# Patient Record
Sex: FEMALE | NOT HISPANIC OR LATINO | Employment: FULL TIME | URBAN - METROPOLITAN AREA
[De-identification: names, ages, dates, MRNs, and addresses within clinical notes are randomized per-mention and may not be internally consistent; named-entity substitution may affect disease eponyms.]

---

## 2017-09-27 ENCOUNTER — APPOINTMENT (OUTPATIENT)
Dept: RADIOLOGY | Facility: MEDICAL CENTER | Age: 22
End: 2017-09-27
Attending: EMERGENCY MEDICINE

## 2017-09-27 ENCOUNTER — HOSPITAL ENCOUNTER (EMERGENCY)
Facility: MEDICAL CENTER | Age: 22
End: 2017-09-28
Attending: EMERGENCY MEDICINE

## 2017-09-27 DIAGNOSIS — Z34.91 NORMAL IUP (INTRAUTERINE PREGNANCY) ON PRENATAL ULTRASOUND, FIRST TRIMESTER: ICD-10-CM

## 2017-09-27 DIAGNOSIS — N39.0 URINARY TRACT INFECTION WITHOUT HEMATURIA, SITE UNSPECIFIED: ICD-10-CM

## 2017-09-27 DIAGNOSIS — O09.90 AT HIGH RISK FOR COMPLICATIONS OF INTRAUTERINE PREGNANCY (IUP): ICD-10-CM

## 2017-09-27 LAB
ALBUMIN SERPL BCP-MCNC: 4.2 G/DL (ref 3.2–4.9)
ALBUMIN/GLOB SERPL: 1.4 G/DL
ALP SERPL-CCNC: 53 U/L (ref 30–99)
ALT SERPL-CCNC: 11 U/L (ref 2–50)
ANION GAP SERPL CALC-SCNC: 9 MMOL/L (ref 0–11.9)
APPEARANCE UR: ABNORMAL
AST SERPL-CCNC: 13 U/L (ref 12–45)
B-HCG SERPL-ACNC: ABNORMAL MIU/ML (ref 0–5)
BACTERIA #/AREA URNS HPF: ABNORMAL /HPF
BACTERIA GENITAL QL WET PREP: NORMAL
BASOPHILS # BLD AUTO: 0.5 % (ref 0–1.8)
BASOPHILS # BLD: 0.03 K/UL (ref 0–0.12)
BILIRUB SERPL-MCNC: 0.3 MG/DL (ref 0.1–1.5)
BILIRUB UR QL STRIP.AUTO: NEGATIVE
BUN SERPL-MCNC: 6 MG/DL (ref 8–22)
CALCIUM SERPL-MCNC: 9.6 MG/DL (ref 8.5–10.5)
CHLORIDE SERPL-SCNC: 105 MMOL/L (ref 96–112)
CO2 SERPL-SCNC: 21 MMOL/L (ref 20–33)
COLOR UR: YELLOW
CREAT SERPL-MCNC: 0.69 MG/DL (ref 0.5–1.4)
CULTURE IF INDICATED INDCX: YES UA CULTURE
EOSINOPHIL # BLD AUTO: 0.07 K/UL (ref 0–0.51)
EOSINOPHIL NFR BLD: 1.3 % (ref 0–6.9)
EPI CELLS #/AREA URNS HPF: ABNORMAL /HPF
ERYTHROCYTE [DISTWIDTH] IN BLOOD BY AUTOMATED COUNT: 39.7 FL (ref 35.9–50)
GFR SERPL CREATININE-BSD FRML MDRD: >60 ML/MIN/1.73 M 2
GLOBULIN SER CALC-MCNC: 2.9 G/DL (ref 1.9–3.5)
GLUCOSE SERPL-MCNC: 90 MG/DL (ref 65–99)
GLUCOSE UR STRIP.AUTO-MCNC: NEGATIVE MG/DL
HCT VFR BLD AUTO: 40.7 % (ref 37–47)
HGB BLD-MCNC: 13.8 G/DL (ref 12–16)
HYALINE CASTS #/AREA URNS LPF: ABNORMAL /LPF
IMM GRANULOCYTES # BLD AUTO: 0.02 K/UL (ref 0–0.11)
IMM GRANULOCYTES NFR BLD AUTO: 0.4 % (ref 0–0.9)
KETONES UR STRIP.AUTO-MCNC: NEGATIVE MG/DL
LEUKOCYTE ESTERASE UR QL STRIP.AUTO: ABNORMAL
LYMPHOCYTES # BLD AUTO: 1.52 K/UL (ref 1–4.8)
LYMPHOCYTES NFR BLD: 27.2 % (ref 22–41)
MCH RBC QN AUTO: 30.1 PG (ref 27–33)
MCHC RBC AUTO-ENTMCNC: 33.9 G/DL (ref 33.6–35)
MCV RBC AUTO: 88.7 FL (ref 81.4–97.8)
MICRO URNS: ABNORMAL
MONOCYTES # BLD AUTO: 0.46 K/UL (ref 0–0.85)
MONOCYTES NFR BLD AUTO: 8.2 % (ref 0–13.4)
NEUTROPHILS # BLD AUTO: 3.49 K/UL (ref 2–7.15)
NEUTROPHILS NFR BLD: 62.4 % (ref 44–72)
NITRITE UR QL STRIP.AUTO: POSITIVE
NRBC # BLD AUTO: 0 K/UL
NRBC BLD AUTO-RTO: 0 /100 WBC
PH UR STRIP.AUTO: 7 [PH]
PLATELET # BLD AUTO: 241 K/UL (ref 164–446)
PMV BLD AUTO: 10.1 FL (ref 9–12.9)
POTASSIUM SERPL-SCNC: 3.8 MMOL/L (ref 3.6–5.5)
PROT SERPL-MCNC: 7.1 G/DL (ref 6–8.2)
PROT UR QL STRIP: NEGATIVE MG/DL
RBC # BLD AUTO: 4.59 M/UL (ref 4.2–5.4)
RBC # URNS HPF: ABNORMAL /HPF
RBC UR QL AUTO: NEGATIVE
SIGNIFICANT IND 70042: NORMAL
SITE SITE: NORMAL
SODIUM SERPL-SCNC: 135 MMOL/L (ref 135–145)
SOURCE SOURCE: NORMAL
SP GR UR STRIP.AUTO: 1.01
UROBILINOGEN UR STRIP.AUTO-MCNC: 0.2 MG/DL
WBC # BLD AUTO: 5.6 K/UL (ref 4.8–10.8)
WBC #/AREA URNS HPF: ABNORMAL /HPF

## 2017-09-27 PROCEDURE — 99284 EMERGENCY DEPT VISIT MOD MDM: CPT

## 2017-09-27 PROCEDURE — 80053 COMPREHEN METABOLIC PANEL: CPT

## 2017-09-27 PROCEDURE — A9270 NON-COVERED ITEM OR SERVICE: HCPCS | Performed by: EMERGENCY MEDICINE

## 2017-09-27 PROCEDURE — 85025 COMPLETE CBC W/AUTO DIFF WBC: CPT

## 2017-09-27 PROCEDURE — 87491 CHLMYD TRACH DNA AMP PROBE: CPT

## 2017-09-27 PROCEDURE — 81001 URINALYSIS AUTO W/SCOPE: CPT

## 2017-09-27 PROCEDURE — 700102 HCHG RX REV CODE 250 W/ 637 OVERRIDE(OP): Performed by: EMERGENCY MEDICINE

## 2017-09-27 PROCEDURE — 87186 SC STD MICRODIL/AGAR DIL: CPT

## 2017-09-27 PROCEDURE — 84702 CHORIONIC GONADOTROPIN TEST: CPT

## 2017-09-27 PROCEDURE — 87086 URINE CULTURE/COLONY COUNT: CPT

## 2017-09-27 PROCEDURE — 87591 N.GONORRHOEAE DNA AMP PROB: CPT

## 2017-09-27 PROCEDURE — 87077 CULTURE AEROBIC IDENTIFY: CPT

## 2017-09-27 PROCEDURE — 76817 TRANSVAGINAL US OBSTETRIC: CPT

## 2017-09-27 RX ORDER — ONDANSETRON 2 MG/ML
4 INJECTION INTRAMUSCULAR; INTRAVENOUS ONCE
Status: DISCONTINUED | OUTPATIENT
Start: 2017-09-27 | End: 2017-09-27

## 2017-09-27 RX ORDER — ACETAMINOPHEN 325 MG/1
650 TABLET ORAL ONCE
Status: COMPLETED | OUTPATIENT
Start: 2017-09-27 | End: 2017-09-27

## 2017-09-27 RX ORDER — ONDANSETRON 4 MG/1
4 TABLET, ORALLY DISINTEGRATING ORAL ONCE
Status: DISCONTINUED | OUTPATIENT
Start: 2017-09-27 | End: 2017-09-28 | Stop reason: HOSPADM

## 2017-09-27 RX ADMIN — ACETAMINOPHEN 650 MG: 325 TABLET, FILM COATED ORAL at 22:27

## 2017-09-27 ASSESSMENT — ENCOUNTER SYMPTOMS
NAUSEA: 1
ABDOMINAL PAIN: 1

## 2017-09-27 ASSESSMENT — PAIN SCALES - GENERAL: PAINLEVEL_OUTOF10: 4

## 2017-09-28 VITALS
HEART RATE: 67 BPM | HEIGHT: 64 IN | DIASTOLIC BLOOD PRESSURE: 51 MMHG | OXYGEN SATURATION: 97 % | WEIGHT: 148.59 LBS | SYSTOLIC BLOOD PRESSURE: 91 MMHG | TEMPERATURE: 97.2 F | BODY MASS INDEX: 25.37 KG/M2 | RESPIRATION RATE: 14 BRPM

## 2017-09-28 LAB
C TRACH DNA SPEC QL NAA+PROBE: NEGATIVE
N GONORRHOEA DNA SPEC QL NAA+PROBE: NEGATIVE
SPECIMEN SOURCE: NORMAL

## 2017-09-28 PROCEDURE — 99284 EMERGENCY DEPT VISIT MOD MDM: CPT

## 2017-09-28 RX ORDER — NITROFURANTOIN 25; 75 MG/1; MG/1
100 CAPSULE ORAL 2 TIMES DAILY
Qty: 14 CAP | Refills: 0 | Status: SHIPPED | OUTPATIENT
Start: 2017-09-28 | End: 2017-10-05

## 2017-09-28 ASSESSMENT — ENCOUNTER SYMPTOMS
CHILLS: 0
FEVER: 0
HEADACHES: 0
SHORTNESS OF BREATH: 0

## 2017-09-28 NOTE — ED NOTES
Assist RN: Patient given discharge teaching and education. Verbalizes understanding. Given chance to ask questions, all questions answered. Educated patient of signs and symptoms to returned to ER, and educated patient they can return for any concerning symptoms. One prescription provided to patient. Education given to patient about medications.Patient states they have their belongings. Denies additional needs at this time. Reports pain is well controlled. Patient monitored every hour and PRN for safety and comfort. Patient ambulatory out of department.

## 2017-09-28 NOTE — ED PROVIDER NOTES
ED Provider Note    Scribed for Ana Maria Quezada M.D. by Dov Zuniga. 2017, 9:56 PM.    Means of arrival: Walk-in  History obtained from: Patient  History limited by: None    CHIEF COMPLAINT  Chief Complaint   Patient presents with   • Pregnancy   • Cramping   • Vaginal Bleeding   • Malaise       HPI  Bandar Trujillo is a 22 y.o. femaleAt approximately 8 weeks pregnant by LMP who presents to the Emergency Department complaining of abdominal pain and vaginal bleeding onset . Her abdominal pain is of a cramping quality. At onset, her bleeding was very heavy. She states that she experienced bleeding of similar severity during a past miscarriage. Her bleeding ceased on , but her cramping has continued. The patient believes she may have miscarried, but is concerned about the continued cramping. The patient reports associated intermittent nausea. Her last normal menstrual period was in mid-July. She has not yet had an ultrasound during this pregnancy, but she did have a positive home pregnancy test. Patient has never required a rhogam treatment. Patient is status A1. Her last miscarriage occurred at 11 weeks.      REVIEW OF SYSTEMS  Review of Systems   Constitutional: Negative for chills and fever.   Respiratory: Negative for shortness of breath.    Cardiovascular: Negative for chest pain.   Gastrointestinal: Positive for abdominal pain and nausea.   Genitourinary: Negative for dysuria.        Positive for vaginal bleeding.   Neurological: Negative for headaches.   All other systems reviewed and are negative.  C    PAST MEDICAL HISTORY    Patient is status A1    SURGICAL HISTORY   has a past surgical history that includes primary c section (10/22/2014).    SOCIAL HISTORY  Social History   Substance Use Topics   • Smoking status: Former Smoker     Packs/day: 0.50     Years: 3.00     Types: Cigarettes     Quit date: 2016              • Alcohol use No      Comment: Occ. when not pregnant.     "  History   Drug Use No       FAMILY HISTORY  Family History   Problem Relation Age of Onset   • Other Mother      Epilepsy       CURRENT MEDICATIONS  No current facility-administered medications on file prior to encounter.      Current Outpatient Prescriptions on File Prior to Encounter   Medication Sig Dispense Refill   • ibuprofen (MOTRIN) 800 MG Tab Take 1 Tab by mouth every 8 hours as needed (Cramping). 30 Tab 0   • Prenatal MV-Min-Fe Fum-FA-DHA (PRENATAL 1 PO) Take  by mouth.         ALLERGIES  Allergies   Allergen Reactions   • Tape Hives and Itching     silk       PHYSICAL EXAM  VITAL SIGNS: /58   Pulse 94   Temp 36.8 °C (98.2 °F)   Resp 16   Ht 1.626 m (5' 4\")   Wt 67.4 kg (148 lb 9.4 oz)   LMP 03/11/2016   SpO2 97%   BMI 25.51 kg/m²   Vitals reviewed by myself.  Physical Exam  Nursing note and vitals reviewed.  Constitutional: Well-developed and well-nourished. No acute distress.   HENT: Head is normocephalic and atraumatic.  Eyes: extra-ocular movements intact  Cardiovascular: Normal rate and regular rhythm. No murmur heard.  Pulmonary/Chest: Breath sounds normal. No wheezes or rales.   Abdominal: Soft and non-tender. No distention.    Pelvic: Normal external genitalia. There is a thin white discharge in the vaginal vault. No bleeding is present, cervical os is closed.  Musculoskeletal: Extremities exhibit normal range of motion without edema or tenderness.   Neurological: Awake and alert  Skin: Skin is warm and dry. No rash.     DIAGNOSTIC STUDIES /  LABS  Labs Reviewed   COMP METABOLIC PANEL - Abnormal; Notable for the following:        Result Value    Bun 6 (*)     All other components within normal limits   HCG QUANTITATIVE - Abnormal; Notable for the following:     Bhcg 034838.5 (*)     All other components within normal limits   URINALYSIS,CULTURE IF INDICATED - Abnormal; Notable for the following:     Character Cloudy (*)     Nitrite Positive (*)     Leukocyte Esterase Moderate (*)  " "   All other components within normal limits   URINE MICROSCOPIC (W/UA) - Abnormal; Notable for the following:     WBC 20-50 (*)     Bacteria Many (*)     Epithelial Cells Moderate (*)     All other components within normal limits   CBC WITH DIFFERENTIAL   ESTIMATED GFR   WET PREP   CHLAMYDIA/GC PCR URINE OR SWAB   URINE CULTURE(NEW)   POC URINE PREGNANCY      All labs reviewed by me.    RADIOLOGY  US-OB PELVIS TRANSVAGINAL   Final Result      Viable 7 week 6 day intrauterine gestation      5 x 2 cm (large) perigestational hemorrhage        The radiologist's interpretation of all radiological studies have been reviewed by me.      REASSESSMENT  10:00 PM Patient evaluated at bedside. I discussed the patient's ED work-up including labs, imaging, and treatment with medication with the patient. She verbalizes understanding and agreement.    10:21 PM I performed a pelvic exam in the presence of a female ED tech at this time.    12:09 AM Recheck: Patient re-evaluated at beside. Patient reports feeling better. Discussed patient's condition and treatment plan. Patient's lab and radiology results discussed. The patient understood and is in agreement.     12:15 AM - Re-examined; The patient is resting in bed comfortably. I discussed her above findings and plans for discharge with a prescription for Progesterone. She was given a referral to [facility]. [facility], [facility] and instructed to return to the ED if her symptoms worsen. Patient understands and agrees. Her vitals prior to discharge are: BP (!) 91/51   Pulse 67   Temp 36.2 °C (97.2 °F)   Resp 14   Ht 1.626 m (5' 4\")   Wt 67.4 kg (148 lb 9.4 oz)   LMP 03/11/2016   SpO2 97%   BMI 25.51 kg/m²       COURSE & MEDICAL DECISION MAKING  Nursing notes, VS, PMSFHx reviewed in chart.    Patient is a 22-year-old female who comes in for bleeding during pregnancy. Differential diagnosis includes miscarriage, normal pregnancy, intrauterine pregnancy, ectopic " pregnancy. Diagnostic workup includes labs, pelvic ultrasound, and urinalysis.    Patient's initial vitals are within normal limits. She is treated with Tylenol and Zofran after which she feels improved. Patient's labs returned and are unremarkable. Beta hCG is 111,000. The patient will return for new or worsening symptoms and is stable at the time of discharge. Upon chart review patient's blood type is noted to be O+, therefore blood typing and rhythm are not required. Ultrasound returned as notable for viable 7 week 6 day intrauterine gestation with associated 5 x 2 cm perigestational hemorrhage. I spoke with on-call gynecologist Dr. Robertson who advised the patient be started on progesterone vaginal suppositories 100 mg twice a day, be placed on pelvic rest, and hydrate adequately. I advised patient of the ultrasound findings and the need for the above care advised to Dr. Robertson. Patient is agreeable to the plan. She is provided with prescription for progesterone and advised to follow-up with gynecologist as soon as possible. Patient is discharged home in stable condition with vitals as normal limits. Of note patient was mistakenly not provided with Macrobid for her urinary tract infection, I attempted to call patient back that she did not answer her phone. The morning team will call patient to provide her with these antibiotics.    DISPOSITION:  Patient will be discharged home in stable condition.    FOLLOW UP:  Anthony Robertson M.D.  [address] [place] [address] [address]  [facility] [address]  [phone]          [facility]  [address] [place] [address] [address]  [facility] [address]  [phone]        OUTPATIENT MEDICATIONS:  Discharge Medication List as of 9/28/2017 12:21 AM      START taking these medications    Details   Progesterone 100 MG Suppos Insert 1 Suppository in vagina 2 Times a Day., Disp-60 Suppository, R-3, Print Rx Paper             FINAL IMPRESSION  1. Normal IUP (intrauterine pregnancy) on prenatal ultrasound,  first trimester    2. At high risk for complications of intrauterine pregnancy (IUP)          Dov LEVY (Scribe), am scribing for, and in the presence of, Ana Maria Quezada M.D..    Electronically signed by: Dov Zuniga (Scribe), 9/27/2017    Ana Maria LEVY M.D. personally performed the services described in this documentation, as scribed by Dov Zuniga in my presence, and it is both accurate and complete.    The note accurately reflects work and decisions made by me.  Ana Maria Quezada  9/28/2017  4:56 AM

## 2017-09-28 NOTE — DISCHARGE INSTRUCTIONS
Please follow up with her obstetrician as soon as possible as he had a high risk pregnancy  Ultrasound results    Viable single intrauterine gestation crown-rump length is 1.53 cm for an estimated gestational age of 7 weeks 6 days, delivery May 10.  Heart rate is 165 beats per minute. There is an irregular hypoechoic 51 x 6 x 21 mm collection along the left margin   of the gestation. Unremarkable yolk sac.    No maternal adnexal mass is identified.    Normal configuration of the ovaries with no enlarged cyst seen    No free pelvic fluid

## 2017-09-28 NOTE — ED NOTES
"Chief Complaint   Patient presents with   • Pregnancy   • Cramping   • Vaginal Bleeding   • Malaise     Patient ambulatory to triage. States that she is approximately eight weeks pregnant. Patient began having \"alot\" of vaginal bleeding last Tuesday. She states that it stopped on Friday/Saturday, but that the cramping has continued. Patient is now feeling nauseated, hot flashes, and just not feeling well. Patient says that she has had a miscarriage in the past and that this feels similar, but worse. /58   Pulse 94   Temp 36.8 °C (98.2 °F)   Resp 16   Ht 1.626 m (5' 4\")   Wt 67.4 kg (148 lb 9.4 oz)   LMP 03/11/2016   SpO2 97%   BMI 25.51 kg/m²     "

## 2017-09-29 LAB
BACTERIA UR CULT: ABNORMAL
BACTERIA UR CULT: ABNORMAL
SIGNIFICANT IND 70042: ABNORMAL
SITE SITE: ABNORMAL
SOURCE SOURCE: ABNORMAL

## 2017-09-29 NOTE — ED NOTES
ED Positive Culture Follow-up/Notification Note:    Date: 9/29/17     Patient seen in the ED on 9/27/2017 for abdominal pain and vaginal bleeding. 8 weeks pregnant.  1. Normal IUP (intrauterine pregnancy) on prenatal ultrasound, first trimester    2. At high risk for complications of intrauterine pregnancy (IUP)    3. Urinary tract infection without hematuria, site unspecified       Discharge Medication List as of 9/28/2017 12:21 AM      START taking these medications    Details   Progesterone 100 MG Suppos Insert 1 Suppository in vagina 2 Times a Day., Disp-60 Suppository, R-3, Print Rx Paper             Allergies: Tape     Final cultures:   Results     Procedure Component Value Units Date/Time    URINE CULTURE(NEW) [[id number]]  (Abnormal)  (Susceptibility) Collected:  09/27/17 2206    Order Status:  Completed Specimen:  Urine Updated:  09/29/17 1149     Significant Indicator POS (POS)     Source UR     Site --     Urine Culture -- (A)     Urine Culture -- (A)     Klebsiella pneumoniae  >100,000 cfu/mL      Culture & Susceptibility     KLEBSIELLA PNEUMONIAE     Antibiotic Sensitivity Microscan Unit Status    Ampicillin Resistant >16 mcg/mL Final    Cefepime Sensitive <=8 mcg/mL Final    Cefotaxime Sensitive <=2 mcg/mL Final    Cefotetan Sensitive <=16 mcg/mL Final    Ceftazidime Sensitive <=1 mcg/mL Final    Ceftriaxone Sensitive <=8 mcg/mL Final    Cefuroxime Sensitive <=4 mcg/mL Final    Cephalothin Sensitive <=8 mcg/mL Final    Ciprofloxacin Sensitive <=1 mcg/mL Final    Gentamicin Sensitive <=4 mcg/mL Final    Levofloxacin Sensitive <=2 mcg/mL Final    Nitrofurantoin Sensitive <=32 mcg/mL Final    Pip/Tazobactam Sensitive <=16 mcg/mL Final    Piperacillin Sensitive <=16 mcg/mL Final    Tigecycline Sensitive <=2 mcg/mL Final    Tobramycin Sensitive <=4 mcg/mL Final    Trimeth/Sulfa Sensitive <=2/38 mcg/mL Final                       CHLAMYDIA & GC BY PCR [[id number]] Collected:  09/27/17 2223    Order Status:   Completed Specimen:  Genital from Genital Updated:  09/28/17 1817     Source Vaginal     C. trachomatis by PCR Negative     N. gonorrhoeae by PCR Negative    WET PREP [[id number]] Collected:  09/27/17 2223    Order Status:  Completed Specimen:  Genital from Vaginal Updated:  09/27/17 2247     Significant Indicator NEG     Source GEN     Site VAGINAL     Wet Prep For Parasites --     Moderate WBC's seen.  No yeast.  No motile Trichomonas seen.  Moderate clue cells seen.      URINALYSIS CULTURE, IF INDICATED [[id number]]  (Abnormal) Collected:  09/27/17 2206    Order Status:  Completed Specimen:  Urine from Urine, Clean Catch Updated:  09/27/17 2228     Color Yellow     Character Cloudy (A)     Specific Gravity 1.009     Ph 7.0     Glucose Negative mg/dL      Ketones Negative mg/dL      Protein Negative mg/dL      Bilirubin Negative     Urobilinogen, Urine 0.2     Nitrite Positive (A)     Leukocyte Esterase Moderate (A)     Occult Blood Negative     Micro Urine Req Microscopic     Culture Indicated Yes UA Culture     URINALYSIS (UA) [[id number]] Collected:  09/27/17 2159    Order Status:  Canceled Specimen:  Urine from Urine, Clean Catch           Plan:   Patient not currently being treated for urinary tract infection or BV.   Patient positive for K. pneumo in the urine and moderate clue cells in wet prep.    I have attempted to contact patient at both numbers listed, but there was no answer. Left a message to return call for results.    If patient returns call new rx's to be:  1. Metronidazole 500 mg po BID x 7 days, #14 - MD Perez per protocol  2. Macrobid 100 mg po BID x 7 days, #14 - MD Chris Herrera

## 2017-10-03 NOTE — ED NOTES
Late entry   9/28/2017 @0843    Attempted to call patient as requested by ERP. Left message to with return phone number in order to verify where patient would like prescription for Macrobid called in to.

## 2018-04-23 ENCOUNTER — NON-PROVIDER VISIT (OUTPATIENT)
Dept: URGENT CARE | Facility: PHYSICIAN GROUP | Age: 23
End: 2018-04-23

## 2018-04-23 DIAGNOSIS — Z02.1 PRE-EMPLOYMENT DRUG SCREENING: ICD-10-CM

## 2018-04-23 DIAGNOSIS — Z11.1 ENCOUNTER FOR PPD TEST: ICD-10-CM

## 2018-04-23 LAB
AMP AMPHETAMINE: NORMAL
COC COCAINE: NORMAL
INT CON NEG: NEGATIVE
INT CON POS: POSITIVE
MET METHAMPHETAMINES: NORMAL
OPI OPIATES: NORMAL
PCP PHENCYCLIDINE: NORMAL
POC DRUG COMMENT 753798-OCCUPATIONAL HEALTH: NORMAL
THC: NORMAL

## 2018-04-23 PROCEDURE — 86580 TB INTRADERMAL TEST: CPT | Performed by: FAMILY MEDICINE

## 2018-04-23 PROCEDURE — 80305 DRUG TEST PRSMV DIR OPT OBS: CPT | Performed by: FAMILY MEDICINE

## 2018-04-24 NOTE — PROGRESS NOTES
Bandar Trujillo is a 23 y.o. female here for a non-provider visit for PPD placement -- Step 1 of 2    Reason for PPD:  work requirement    1. TB evaluation questionnaire completed by patient? Yes      -  If any answers marked yes did you contact a provider prior to placing? Not Indicated  2.  Patient notified to return to clinic for reading on: 4/25  3.  PPD Placement documentation completed on TB evaluation questionnaire? Yes  4.  Location of TB evaluation questionnaire filed: file folder

## 2018-04-25 ENCOUNTER — NON-PROVIDER VISIT (OUTPATIENT)
Dept: URGENT CARE | Facility: PHYSICIAN GROUP | Age: 23
End: 2018-04-25

## 2018-04-25 LAB — TB WHEAL 3D P 5 TU DIAM: NORMAL MM

## 2018-04-26 NOTE — PROGRESS NOTES
Bandar Trujillo is a 23 y.o. female here for a non-provider visit for PPD reading -- Step 1 of 2.      1.  Resulted in Epic under enter/edit results? Yes   2.  TB evaluation questionnaire scanned into chart and original given to patient?Yes      3. Was induration greater than 0 mm? No.    If Step 1 of 2, when is patient returning for second step (delete if N/A): 7 days from first injection    Routed to PCP? No

## 2020-02-08 ENCOUNTER — HOSPITAL ENCOUNTER (EMERGENCY)
Dept: HOSPITAL 8 - ED | Age: 25
Discharge: HOME | End: 2020-02-08
Payer: SELF-PAY

## 2020-02-08 VITALS — WEIGHT: 157.41 LBS | HEIGHT: 65 IN | BODY MASS INDEX: 26.23 KG/M2

## 2020-02-08 VITALS — SYSTOLIC BLOOD PRESSURE: 95 MMHG | DIASTOLIC BLOOD PRESSURE: 55 MMHG

## 2020-02-08 DIAGNOSIS — O99.331: ICD-10-CM

## 2020-02-08 DIAGNOSIS — F17.200: ICD-10-CM

## 2020-02-08 DIAGNOSIS — O20.0: Primary | ICD-10-CM

## 2020-02-08 DIAGNOSIS — Z3A.01: ICD-10-CM

## 2020-02-08 LAB
ALBUMIN SERPL-MCNC: 4.1 G/DL (ref 3.4–5)
ANION GAP SERPL CALC-SCNC: 7 MMOL/L (ref 5–15)
BASOPHILS # BLD AUTO: 0.05 X10^3/UL (ref 0–0.1)
BASOPHILS NFR BLD AUTO: 1 % (ref 0–1)
CALCIUM SERPL-MCNC: 8.9 MG/DL (ref 8.5–10.1)
CHLORIDE SERPL-SCNC: 109 MMOL/L (ref 98–107)
CREAT SERPL-MCNC: 0.87 MG/DL (ref 0.55–1.02)
CULTURE INDICATED?: NO
EOSINOPHIL # BLD AUTO: 0.03 X10^3/UL (ref 0–0.4)
EOSINOPHIL NFR BLD AUTO: 1 % (ref 1–7)
ERYTHROCYTE [DISTWIDTH] IN BLOOD BY AUTOMATED COUNT: 13.2 % (ref 9.6–15.2)
LYMPHOCYTES # BLD AUTO: 1.57 X10^3/UL (ref 1–3.4)
LYMPHOCYTES NFR BLD AUTO: 27 % (ref 22–44)
MCH RBC QN AUTO: 30.4 PG (ref 27–34.8)
MCHC RBC AUTO-ENTMCNC: 34.1 G/DL (ref 32.4–35.8)
MCV RBC AUTO: 89.3 FL (ref 80–100)
MD: NO
MICROSCOPIC: (no result)
MONOCYTES # BLD AUTO: 0.48 X10^3/UL (ref 0.2–0.8)
MONOCYTES NFR BLD AUTO: 8 % (ref 2–9)
NEUTROPHILS # BLD AUTO: 3.7 X10^3/UL (ref 1.8–6.8)
NEUTROPHILS NFR BLD AUTO: 63 % (ref 42–75)
PLATELET # BLD AUTO: 209 X10^3/UL (ref 130–400)
PMV BLD AUTO: 8.8 FL (ref 7.4–10.4)
RBC # BLD AUTO: 4.46 X10^6/UL (ref 3.82–5.3)

## 2020-02-08 PROCEDURE — 84702 CHORIONIC GONADOTROPIN TEST: CPT

## 2020-02-08 PROCEDURE — 81003 URINALYSIS AUTO W/O SCOPE: CPT

## 2020-02-08 PROCEDURE — 85025 COMPLETE CBC W/AUTO DIFF WBC: CPT

## 2020-02-08 PROCEDURE — 86901 BLOOD TYPING SEROLOGIC RH(D): CPT

## 2020-02-08 PROCEDURE — 36415 COLL VENOUS BLD VENIPUNCTURE: CPT

## 2020-02-08 PROCEDURE — 82040 ASSAY OF SERUM ALBUMIN: CPT

## 2020-02-08 PROCEDURE — 80048 BASIC METABOLIC PNL TOTAL CA: CPT

## 2020-02-08 PROCEDURE — 76801 OB US < 14 WKS SINGLE FETUS: CPT

## 2020-02-08 PROCEDURE — 99284 EMERGENCY DEPT VISIT MOD MDM: CPT

## 2020-02-08 NOTE — NUR
PT GIVEN GOWN AND ASKED TO CHANGE. EDMD BEDSIDE

-------------------------------------------------------------------------------

Addendum: 02/08/20 at 1212 by [email or web address]

-------------------------------------------------------------------------------

23 Y/O FEMALE PRESENTS TO ED WITH C/O ABD CRAMPING. PT IS 7 WEEKS PREGNANT. PER 
PT "I'VE BEEN CRAMPING THE WHOLE TIME, BUT TODAY IT GOT WORSE. I'M NOT 
BLEEDING."NADN. PT PLACED ON CONT PULSE OX,NIBP. NO C/O N/V/D, TRAUMA, SYNCOPE, 
CP, SOB.

## 2020-02-08 NOTE — NUR
task RN note: results and poc discussed between MD and patient. dc orders 
received. pt given dc instructions, pt a&o, rsps even and unlabored, gait 
steady to dc. nadn at dc.

## 2020-04-17 ENCOUNTER — HOSPITAL ENCOUNTER (OUTPATIENT)
Facility: MEDICAL CENTER | Age: 25
End: 2020-04-17
Attending: NURSE PRACTITIONER
Payer: MEDICAID

## 2020-04-17 ENCOUNTER — INITIAL PRENATAL (OUTPATIENT)
Dept: OBGYN | Facility: CLINIC | Age: 25
End: 2020-04-17
Payer: MEDICAID

## 2020-04-17 VITALS
WEIGHT: 162 LBS | BODY MASS INDEX: 26.99 KG/M2 | DIASTOLIC BLOOD PRESSURE: 58 MMHG | SYSTOLIC BLOOD PRESSURE: 100 MMHG | HEIGHT: 65 IN

## 2020-04-17 DIAGNOSIS — O34.219 HISTORY OF CESAREAN DELIVERY, CURRENTLY PREGNANT: ICD-10-CM

## 2020-04-17 DIAGNOSIS — O09.92 ENCOUNTER FOR SUPERVISION OF HIGH RISK PREGNANCY IN SECOND TRIMESTER, ANTEPARTUM: ICD-10-CM

## 2020-04-17 DIAGNOSIS — Z34.90 PRENATAL CARE, ANTEPARTUM: ICD-10-CM

## 2020-04-17 DIAGNOSIS — O09.92 ENCOUNTER FOR SUPERVISION OF HIGH RISK PREGNANCY IN SECOND TRIMESTER, ANTEPARTUM: Primary | ICD-10-CM

## 2020-04-17 LAB
APPEARANCE UR: NORMAL
BILIRUB UR STRIP-MCNC: NORMAL MG/DL
COLOR UR AUTO: NORMAL
GLUCOSE UR STRIP.AUTO-MCNC: NEGATIVE MG/DL
INT CON NEG: NEGATIVE
INT CON POS: POSITIVE
KETONES UR STRIP.AUTO-MCNC: NEGATIVE MG/DL
LEUKOCYTE ESTERASE UR QL STRIP.AUTO: NEGATIVE
NITRITE UR QL STRIP.AUTO: NEGATIVE
PH UR STRIP.AUTO: 7 [PH] (ref 5–8)
POC URINE PREGNANCY TEST: POSITIVE
PROT UR QL STRIP: NEGATIVE MG/DL
RBC UR QL AUTO: NEGATIVE
SP GR UR STRIP.AUTO: 1.02
UROBILINOGEN UR STRIP-MCNC: NORMAL MG/DL

## 2020-04-17 PROCEDURE — 87491 CHLMYD TRACH DNA AMP PROBE: CPT

## 2020-04-17 PROCEDURE — 0500F INITIAL PRENATAL CARE VISIT: CPT | Performed by: NURSE PRACTITIONER

## 2020-04-17 PROCEDURE — 88175 CYTOPATH C/V AUTO FLUID REDO: CPT

## 2020-04-17 PROCEDURE — 81025 URINE PREGNANCY TEST: CPT | Performed by: NURSE PRACTITIONER

## 2020-04-17 PROCEDURE — 87591 N.GONORRHOEAE DNA AMP PROB: CPT

## 2020-04-17 PROCEDURE — 81002 URINALYSIS NONAUTO W/O SCOPE: CPT | Performed by: NURSE PRACTITIONER

## 2020-04-17 ASSESSMENT — ENCOUNTER SYMPTOMS
EYES NEGATIVE: 1
PSYCHIATRIC NEGATIVE: 1
CONSTITUTIONAL NEGATIVE: 1
MUSCULOSKELETAL NEGATIVE: 1
GASTROINTESTINAL NEGATIVE: 1
RESPIRATORY NEGATIVE: 1
NEUROLOGICAL NEGATIVE: 1
CARDIOVASCULAR NEGATIVE: 1

## 2020-04-17 NOTE — PROGRESS NOTES
NOB visit  LMP: Unknown  WT: 162 lb  BP: 100/58  Pt states she was seen at [facility] on 02/08/2020 due cramping and spotting. US was done that day. Records are being requested by [facility].   Wilber # [phone]

## 2020-04-17 NOTE — LETTER
Cystic Fibrosis Carrier Testing  Bandar Trujillo    The following information is about a blood test that can be done to determine if you and/or your partner carry the gene for cystic fibrosis.    WHAT IS CYSTIC FIBROSIS?  · Cystic fibrosis (CF) is an inherited disease that affects more than 25,000 American children and young adults.  · Symptoms of CF vary but include lung congestion, pneumonia, diarrhea and poor growth.  Most people with CF have severe medical problems and some die at a young age.  Others have so few symptoms they are unaware they have CF.  · CF does not affect intelligence.  · Although there is no cure for CF at this time, scientists are making progress in improving treatment and in searching for a cure.  In the past many people with CF  at a very young age.  Today, many are living into their 20’s and 30’s.    IS THERE A CHANCE MY BABY COULD HAVE CYSTIC FIBROSIS?  · You can have a child with CF even if there is no history in your family (see chart below).  · CF testing can help determine if you are a carrier and at risk to have a child with CF.  Note: if both parents are carriers, there is a 1 in 4 (25%) chance with each pregnancy that they will have a child with CF.  · Carriers have one normal CF gene and one altered CF gene.  · People with CF have two altered CF genes.  · Most people have two normal copies of the CF gene.    Approximate risk that a couple with no family history of cystic fibrosis will have a child with cystic fibrosis:    Ethnic background / Risk     couple:  1 in 2,500   couple:  1 in 15,000            couple:  1 in 8,000     American couple:  1 in 32,000     WHAT TESTING IS AVAILABLE?  · There is a blood test that can be done to find out if you or your partner is a carrier.  · It is important to understand that CF carrier testing does not detect all CF carriers.  · If the test shows that you are both CF carriers, you unborn baby can be  tested to find out if the baby has CF.    HOW MUCH DOES IT COST TO HAVE CYSTIC FIBROSIS CARRIER TESTING?  · Cost and insurance coverage for CF carrier testing vary depending upon the laboratory used and your insurance policy.  · The average cost for CF carrier testing is $300 per person.  · Your genetic counselor can provide you with more information about cystic fibrosis carrier testing.    _____  Yes, I am interested in discussing carrier testing with a genetic counselor.    _____  No, I am not interested in CF carrier testing or in receiving more information about CF carrier testing.      Client signature: ________________________________________  4/17/2020

## 2020-04-17 NOTE — PROGRESS NOTES
"Subjective:      S:  Bandar Trujillo is a 25 y.o.  female  @ EGA: 16w1d ELIZABETH: Estimated Date of Delivery: 10/1/20  per US at [facility] who presents for her new OB exam.      Her OB hx includes 1 C/S for FTP followed by successful , desires vaginal delivery.   History of HSV I or II in self or partner: no  History of STIs in self or partner: no  History of Thyroid problems: no    ONe ER visits at [facility] for spotting/crampng in this pregnancy.SANDOR signed. She has no complaints.  Desires AFP.  Declines CF.  Reports positive FM, no VB, LOF, or cramping.  Denies dysuria, vaginal DC.  Pt is  and lives with FOB. FOB is happy and involved. She is currently working as , no heavy lifting, no chemical exposure.  Pregnancy is unplanned but desired.    O:    Vitals:    20 0754   BP: 100/58   Weight: 73.5 kg (162 lb)   Height: 1.651 m (5' 5\")    See H&P Prenatal Physical.  Wet mount: not indicated        FHTs: 140        Fundal ht: 16cm     A:   1.  IUP @ 16w1d ELIZABETH: Estimated Date of Delivery: 10/1/20 per US         2.  S=D        3.    Patient Active Problem List    Diagnosis Date Noted   • Encounter for supervision of high risk pregnancy in second trimester, antepartum 2020   • History of  delivery with successful , desires TOLAC 2020         P:  1.  GC/CT done. Pap done.         2.  Prenatal labs and UDS ordered - lab slip given        3.  Discussed diet and exercise during pregnancy. Encouraged good nutrition, and daily exercise including walking or swimming. Discussed expected weight gain during pregnancy.              4.  Discussed adequate hydration during pregnancy.        5.  NOB packet given        6.  Return to office in 4 wks with physician        7.  Complete OB US in 3-4 wks        8.  Pregnancy guide provided        9.  Childbirth education discussed. Not a candidate for Centering Pregnancy    HPI    Review of Systems   Constitutional: Negative.  " "  HENT: Negative.    Eyes: Negative.    Respiratory: Negative.    Cardiovascular: Negative.    Gastrointestinal: Negative.    Genitourinary: Negative.    Musculoskeletal: Negative.    Skin: Negative.    Neurological: Negative.    Endo/Heme/Allergies: Negative.    Psychiatric/Behavioral: Negative.           Objective:     /58   Ht 1.651 m (5' 5\")   Wt 73.5 kg (162 lb)   BMI 26.96 kg/m²      Physical Exam  Vitals signs and nursing note reviewed.   Constitutional:       Appearance: She is well-developed.   Neck:      Musculoskeletal: Normal range of motion and neck supple.   Cardiovascular:      Rate and Rhythm: Normal rate and regular rhythm.      Heart sounds: Normal heart sounds.   Pulmonary:      Effort: Pulmonary effort is normal.      Breath sounds: Normal breath sounds.   Abdominal:      Palpations: Abdomen is soft.   Genitourinary:     Vagina: Normal.      Comments: Uterus enlarged, c/w 16 wk ga  Musculoskeletal: Normal range of motion.   Skin:     General: Skin is warm and dry.   Neurological:      Mental Status: She is alert and oriented to person, place, and time.      Deep Tendon Reflexes: Reflexes are normal and symmetric.   Psychiatric:         Behavior: Behavior normal.         Thought Content: Thought content normal.         Judgment: Judgment normal.                 Assessment/Plan:       1. Encounter for supervision of high risk pregnancy in second trimester, antepartum    - US-OB 2ND 3RD TRI COMPLETE; Future  - PRENATAL PANEL 3+HIV+HCV; Future  - HEP C VIRUS ANTIBODY; Future  - AFP TETRA; Future  - THINPREP RFLX HPV ASCUS W/CTNG; Future    2. Prenatal care, antepartum    - POCT Pregnancy  - POCT Urinalysis    3. History of  delivery with successful , desires TOLAC      "

## 2020-04-21 LAB
C TRACH DNA GENITAL QL NAA+PROBE: NEGATIVE
CYTOLOGY REG CYTOL: NORMAL
N GONORRHOEA DNA GENITAL QL NAA+PROBE: NEGATIVE
SPECIMEN SOURCE: NORMAL

## 2020-04-30 ENCOUNTER — HOSPITAL ENCOUNTER (OUTPATIENT)
Dept: LAB | Facility: MEDICAL CENTER | Age: 25
End: 2020-04-30
Attending: NURSE PRACTITIONER
Payer: MEDICAID

## 2020-04-30 DIAGNOSIS — O09.92 ENCOUNTER FOR SUPERVISION OF HIGH RISK PREGNANCY IN SECOND TRIMESTER, ANTEPARTUM: ICD-10-CM

## 2020-04-30 LAB
ABO GROUP BLD: NORMAL
BASOPHILS # BLD AUTO: 0.5 % (ref 0–1.8)
BASOPHILS # BLD: 0.03 K/UL (ref 0–0.12)
BLD GP AB SCN SERPL QL: NORMAL
EOSINOPHIL # BLD AUTO: 0.05 K/UL (ref 0–0.51)
EOSINOPHIL NFR BLD: 0.8 % (ref 0–6.9)
ERYTHROCYTE [DISTWIDTH] IN BLOOD BY AUTOMATED COUNT: 44.3 FL (ref 35.9–50)
HBV SURFACE AG SER QL: ABNORMAL
HCT VFR BLD AUTO: 37.7 % (ref 37–47)
HCV AB SER QL: ABNORMAL
HGB BLD-MCNC: 12.9 G/DL (ref 12–16)
HIV 1+2 AB+HIV1 P24 AG SERPL QL IA: NORMAL
IMM GRANULOCYTES # BLD AUTO: 0.02 K/UL (ref 0–0.11)
IMM GRANULOCYTES NFR BLD AUTO: 0.3 % (ref 0–0.9)
LYMPHOCYTES # BLD AUTO: 1.51 K/UL (ref 1–4.8)
LYMPHOCYTES NFR BLD: 22.9 % (ref 22–41)
MCH RBC QN AUTO: 31.3 PG (ref 27–33)
MCHC RBC AUTO-ENTMCNC: 34.2 G/DL (ref 33.6–35)
MCV RBC AUTO: 91.5 FL (ref 81.4–97.8)
MONOCYTES # BLD AUTO: 0.45 K/UL (ref 0–0.85)
MONOCYTES NFR BLD AUTO: 6.8 % (ref 0–13.4)
NEUTROPHILS # BLD AUTO: 4.52 K/UL (ref 2–7.15)
NEUTROPHILS NFR BLD: 68.7 % (ref 44–72)
NRBC # BLD AUTO: 0 K/UL
NRBC BLD-RTO: 0 /100 WBC
PLATELET # BLD AUTO: 233 K/UL (ref 164–446)
PMV BLD AUTO: 10.7 FL (ref 9–12.9)
RBC # BLD AUTO: 4.12 M/UL (ref 4.2–5.4)
RH BLD: NORMAL
RUBV AB SER QL: 21.5 IU/ML
TREPONEMA PALLIDUM IGG+IGM AB [PRESENCE] IN SERUM OR PLASMA BY IMMUNOASSAY: ABNORMAL
WBC # BLD AUTO: 6.6 K/UL (ref 4.8–10.8)

## 2020-04-30 PROCEDURE — 86850 RBC ANTIBODY SCREEN: CPT

## 2020-04-30 PROCEDURE — 86762 RUBELLA ANTIBODY: CPT

## 2020-04-30 PROCEDURE — 86901 BLOOD TYPING SEROLOGIC RH(D): CPT

## 2020-04-30 PROCEDURE — 87340 HEPATITIS B SURFACE AG IA: CPT

## 2020-04-30 PROCEDURE — 87389 HIV-1 AG W/HIV-1&-2 AB AG IA: CPT

## 2020-04-30 PROCEDURE — 81511 FTL CGEN ABNOR FOUR ANAL: CPT

## 2020-04-30 PROCEDURE — 86803 HEPATITIS C AB TEST: CPT

## 2020-04-30 PROCEDURE — 86780 TREPONEMA PALLIDUM: CPT

## 2020-04-30 PROCEDURE — 86900 BLOOD TYPING SEROLOGIC ABO: CPT

## 2020-04-30 PROCEDURE — 85025 COMPLETE CBC W/AUTO DIFF WBC: CPT

## 2020-04-30 PROCEDURE — 36415 COLL VENOUS BLD VENIPUNCTURE: CPT

## 2020-05-04 LAB
# FETUSES US: NORMAL
AFP MOM SERPL: 1.23
AFP SERPL-MCNC: 50 NG/ML
AGE - REPORTED: 26.6 YR
CURRENT SMOKER: NO
FAMILY MEMBER DISEASES HX: NO
GA METHOD: NORMAL
GA: NORMAL WK
HCG MOM SERPL: 1.58
HCG SERPL-ACNC: NORMAL IU/L
HX OF HEREDITARY DISORDERS: NO
IDDM PATIENT QL: NO
INHIBIN A MOM SERPL: 1.48
INHIBIN A SERPL-MCNC: 214 PG/ML
INTEGRATED SCN PATIENT-IMP: NORMAL
PATHOLOGY STUDY: NORMAL
SPECIMEN DRAWN SERPL: NORMAL
U ESTRIOL MOM SERPL: 0.95
U ESTRIOL SERPL-MCNC: 1.51 NG/ML

## 2020-05-15 ENCOUNTER — ROUTINE PRENATAL (OUTPATIENT)
Dept: OBGYN | Facility: CLINIC | Age: 25
End: 2020-05-15
Payer: MEDICAID

## 2020-05-15 VITALS — SYSTOLIC BLOOD PRESSURE: 100 MMHG | WEIGHT: 165 LBS | BODY MASS INDEX: 27.46 KG/M2 | DIASTOLIC BLOOD PRESSURE: 54 MMHG

## 2020-05-15 DIAGNOSIS — O09.92 ENCOUNTER FOR SUPERVISION OF HIGH RISK PREGNANCY IN SECOND TRIMESTER, ANTEPARTUM: ICD-10-CM

## 2020-05-15 DIAGNOSIS — O34.219 HISTORY OF CESAREAN DELIVERY, CURRENTLY PREGNANT: ICD-10-CM

## 2020-05-15 PROCEDURE — 90040 PR PRENATAL FOLLOW UP: CPT | Performed by: OBSTETRICS & GYNECOLOGY

## 2020-05-15 NOTE — PROGRESS NOTES
OB follow up   + fetal movement. Active   No VB, LOF or UC's.  Wt: 165.0LBS       BP:  100/54  Phone # [phone]  Preferred pharmacy confirmed.  US scheduled for 5/21/2020  Up to date labs

## 2020-05-15 NOTE — PROGRESS NOTES
"JAZMIN:  20w1d    Pt reports doing well.  Reports +FM, Denies vaginal bleeding, contractions, LOF.    /54   Wt 74.8 kg (165 lb)   BMI 27.46 kg/m²   gen: GLENNO, NAD  FHTs: 140  FH: 20    A/P: 25 y.o.  @ 20w1d by lmp c/w 6w1d US (at  [facility] - results in media)   #PNL up to date, Rh+; anatomy US scheduled  #routine preg precautions discussed  #desires TOLAC - as below  #Plans to BF - classes discussed  #Discussed chosing a pediatrician    RTC 4wks    TOLAC Counseling Note       Summary of prior delivery history:  CS for \"failure to progress\",  sucessful  w/ spontaneous labor      Prior  History:    Gestation Age: 39w5d    Indication for : failure to progress    Labor: no - IOL for IUGR    What hospital/state/country was  done: here    Any post-operative counseling regarding mode of delivery for next pregnancy:  no    Operative Note reviewed: yes       Patient was counseled regarding the benefits and risks of trial of labor after a prior  versus a repeat . These general risks and benefits are included in the institutional consent that the patient signed at the clinic. Basically the patient was told that a successful vaginal delivery is the safest mode for mother and baby. A repeat  section prior to labor or in early labor is almost as safe for the mother as a vaginal delivery and minimizes the risk of uterine rupture but does carry a slight increase risk of excessive bleeding, infection or injury to adjacent organs. The mode of delivery with the greatest risk of complications is a  in active labor. A successful vaginal delivery has less risk of major hemorrhage and infection and typically a shorter hospital stay, however there is the risk of uterine rupture. A repeat  also increases the risk of subsequent pregnancies being delivered by .       In particular the patient was told that with spontaneous labor her chances of a " successful trial of labor was 95% and her risk of uterine rupture was <1%. These percentages were influenced by her history of ethnicity, BMI, reason for CS, delivery history, and type of uterine scar.       Based on this counseling the patient indicates at this time a preference for       TOLAC            She knows that she may change her mind at a later date. She has also been told that she may be advised by the medical staff later in pregnancy or during labor that she should consider a different mode of delivery given new developments or complications in her pregnancy.          Counseling time was 10 minutes to discuss TOLAC.

## 2020-05-21 ENCOUNTER — APPOINTMENT (OUTPATIENT)
Dept: RADIOLOGY | Facility: IMAGING CENTER | Age: 25
End: 2020-05-21
Attending: NURSE PRACTITIONER
Payer: MEDICAID

## 2020-05-21 DIAGNOSIS — O28.3 ABNORMAL FETAL ULTRASOUND: ICD-10-CM

## 2020-05-21 DIAGNOSIS — O09.92 ENCOUNTER FOR SUPERVISION OF HIGH RISK PREGNANCY IN SECOND TRIMESTER, ANTEPARTUM: ICD-10-CM

## 2020-05-21 PROCEDURE — 76805 OB US >/= 14 WKS SNGL FETUS: CPT | Mod: TC | Performed by: OBSTETRICS & GYNECOLOGY

## 2020-05-22 ENCOUNTER — TELEPHONE (OUTPATIENT)
Dept: OBGYN | Facility: CLINIC | Age: 25
End: 2020-05-22

## 2020-05-22 NOTE — TELEPHONE ENCOUNTER
Pt was explained about EIF and possible [place]amale. Pt understood, will wait for a call from Dr. Campos's office and no further questions.     ----- Message from Francoise Almazan C.N.M. sent at 5/21/2020  9:57 PM PDT -----  Noted; will refer to Dr. Campos for clearance of heart. Will notify patient.

## 2020-05-22 NOTE — TELEPHONE ENCOUNTER
Pt walked in to get more information about what they found on her US. She wanted to speak with a provider, I explained no provider is available since they have patients. I showed her pictures of each that might be there and questionable. I went into more detail what it is for both. I discussed with her that's why we are sending you to Dr. Campos to get more of a clear image of the heart and he'll discuss the risks and everything. Pt understood and will wait for Dr. campos's call.

## 2020-06-25 ENCOUNTER — HOSPITAL ENCOUNTER (OUTPATIENT)
Dept: LAB | Facility: MEDICAL CENTER | Age: 25
End: 2020-06-25
Attending: SPECIALIST
Payer: MEDICAID

## 2020-06-25 LAB
GLUCOSE 1H P 50 G GLC PO SERPL-MCNC: 124 MG/DL (ref 70–139)
HCT VFR BLD AUTO: 37.6 % (ref 37–47)
HGB BLD-MCNC: 12.2 G/DL (ref 12–16)
TREPONEMA PALLIDUM IGG+IGM AB [PRESENCE] IN SERUM OR PLASMA BY IMMUNOASSAY: NORMAL

## 2020-06-25 PROCEDURE — 85018 HEMOGLOBIN: CPT

## 2020-06-25 PROCEDURE — 86780 TREPONEMA PALLIDUM: CPT

## 2020-06-25 PROCEDURE — 82950 GLUCOSE TEST: CPT

## 2020-06-25 PROCEDURE — 36415 COLL VENOUS BLD VENIPUNCTURE: CPT

## 2020-06-25 PROCEDURE — 85014 HEMATOCRIT: CPT

## 2020-06-28 DIAGNOSIS — O40.3XX0 POLYHYDRAMNIOS IN THIRD TRIMESTER COMPLICATION, SINGLE OR UNSPECIFIED FETUS: ICD-10-CM

## 2020-09-24 ENCOUNTER — HOSPITAL ENCOUNTER (OUTPATIENT)
Facility: MEDICAL CENTER | Age: 25
End: 2020-09-25
Attending: SPECIALIST | Admitting: SPECIALIST
Payer: MEDICAID

## 2020-09-24 VITALS
DIASTOLIC BLOOD PRESSURE: 70 MMHG | RESPIRATION RATE: 16 BRPM | TEMPERATURE: 97.2 F | SYSTOLIC BLOOD PRESSURE: 116 MMHG | WEIGHT: 181 LBS | OXYGEN SATURATION: 97 % | HEIGHT: 64 IN | BODY MASS INDEX: 30.9 KG/M2 | HEART RATE: 99 BPM

## 2020-09-25 ENCOUNTER — ANESTHESIA EVENT (OUTPATIENT)
Dept: ANESTHESIOLOGY | Facility: MEDICAL CENTER | Age: 25
End: 2020-09-25
Payer: MEDICAID

## 2020-09-25 ENCOUNTER — ANESTHESIA (OUTPATIENT)
Dept: ANESTHESIOLOGY | Facility: MEDICAL CENTER | Age: 25
End: 2020-09-25
Payer: MEDICAID

## 2020-09-25 ENCOUNTER — HOSPITAL ENCOUNTER (INPATIENT)
Facility: MEDICAL CENTER | Age: 25
LOS: 2 days | End: 2020-09-27
Attending: SPECIALIST | Admitting: SPECIALIST
Payer: MEDICAID

## 2020-09-25 DIAGNOSIS — O09.92 ENCOUNTER FOR SUPERVISION OF HIGH RISK PREGNANCY IN SECOND TRIMESTER, ANTEPARTUM: ICD-10-CM

## 2020-09-25 DIAGNOSIS — O34.219 HISTORY OF CESAREAN DELIVERY, CURRENTLY PREGNANT: ICD-10-CM

## 2020-09-25 LAB
BASOPHILS # BLD AUTO: 0.3 % (ref 0–1.8)
BASOPHILS # BLD: 0.03 K/UL (ref 0–0.12)
COVID ORDER STATUS COVID19: NORMAL
EOSINOPHIL # BLD AUTO: 0.08 K/UL (ref 0–0.51)
EOSINOPHIL NFR BLD: 0.9 % (ref 0–6.9)
ERYTHROCYTE [DISTWIDTH] IN BLOOD BY AUTOMATED COUNT: 45.6 FL (ref 35.9–50)
ERYTHROCYTE [DISTWIDTH] IN BLOOD BY AUTOMATED COUNT: 47 FL (ref 35.9–50)
HCT VFR BLD AUTO: 34.6 % (ref 37–47)
HCT VFR BLD AUTO: 36.2 % (ref 37–47)
HGB BLD-MCNC: 11.7 G/DL (ref 12–16)
HGB BLD-MCNC: 12.5 G/DL (ref 12–16)
HOLDING TUBE BB 8507: NORMAL
IMM GRANULOCYTES # BLD AUTO: 0.1 K/UL (ref 0–0.11)
IMM GRANULOCYTES NFR BLD AUTO: 1.1 % (ref 0–0.9)
LYMPHOCYTES # BLD AUTO: 2.31 K/UL (ref 1–4.8)
LYMPHOCYTES NFR BLD: 24.9 % (ref 22–41)
MCH RBC QN AUTO: 30.1 PG (ref 27–33)
MCH RBC QN AUTO: 30.3 PG (ref 27–33)
MCHC RBC AUTO-ENTMCNC: 33.8 G/DL (ref 33.6–35)
MCHC RBC AUTO-ENTMCNC: 34.5 G/DL (ref 33.6–35)
MCV RBC AUTO: 87.7 FL (ref 81.4–97.8)
MCV RBC AUTO: 88.9 FL (ref 81.4–97.8)
MONOCYTES # BLD AUTO: 0.97 K/UL (ref 0–0.85)
MONOCYTES NFR BLD AUTO: 10.5 % (ref 0–13.4)
NEUTROPHILS # BLD AUTO: 5.77 K/UL (ref 2–7.15)
NEUTROPHILS NFR BLD: 62.3 % (ref 44–72)
NRBC # BLD AUTO: 0 K/UL
NRBC BLD-RTO: 0 /100 WBC
PLATELET # BLD AUTO: 169 K/UL (ref 164–446)
PLATELET # BLD AUTO: 180 K/UL (ref 164–446)
PMV BLD AUTO: 10.9 FL (ref 9–12.9)
PMV BLD AUTO: 11.3 FL (ref 9–12.9)
RBC # BLD AUTO: 3.89 M/UL (ref 4.2–5.4)
RBC # BLD AUTO: 4.13 M/UL (ref 4.2–5.4)
SARS-COV-2 RDRP RESP QL NAA+PROBE: NOTDETECTED
SPECIMEN SOURCE: NORMAL
WBC # BLD AUTO: 11.3 K/UL (ref 4.8–10.8)
WBC # BLD AUTO: 9.3 K/UL (ref 4.8–10.8)

## 2020-09-25 PROCEDURE — 700101 HCHG RX REV CODE 250: Performed by: ANESTHESIOLOGY

## 2020-09-25 PROCEDURE — 90471 IMMUNIZATION ADMIN: CPT

## 2020-09-25 PROCEDURE — 770002 HCHG ROOM/CARE - OB PRIVATE (112)

## 2020-09-25 PROCEDURE — 700111 HCHG RX REV CODE 636 W/ 250 OVERRIDE (IP): Performed by: SPECIALIST

## 2020-09-25 PROCEDURE — 700111 HCHG RX REV CODE 636 W/ 250 OVERRIDE (IP): Performed by: OBSTETRICS & GYNECOLOGY

## 2020-09-25 PROCEDURE — 700102 HCHG RX REV CODE 250 W/ 637 OVERRIDE(OP): Performed by: OBSTETRICS & GYNECOLOGY

## 2020-09-25 PROCEDURE — 10H073Z INSERTION OF MONITORING ELECTRODE INTO PRODUCTS OF CONCEPTION, VIA NATURAL OR ARTIFICIAL OPENING: ICD-10-PCS | Performed by: SPECIALIST

## 2020-09-25 PROCEDURE — 36415 COLL VENOUS BLD VENIPUNCTURE: CPT

## 2020-09-25 PROCEDURE — 10H07YZ INSERTION OF OTHER DEVICE INTO PRODUCTS OF CONCEPTION, VIA NATURAL OR ARTIFICIAL OPENING: ICD-10-PCS | Performed by: SPECIALIST

## 2020-09-25 PROCEDURE — 3E0234Z INTRODUCTION OF SERUM, TOXOID AND VACCINE INTO MUSCLE, PERCUTANEOUS APPROACH: ICD-10-PCS | Performed by: SPECIALIST

## 2020-09-25 PROCEDURE — 700105 HCHG RX REV CODE 258: Performed by: OBSTETRICS & GYNECOLOGY

## 2020-09-25 PROCEDURE — 10907ZC DRAINAGE OF AMNIOTIC FLUID, THERAPEUTIC FROM PRODUCTS OF CONCEPTION, VIA NATURAL OR ARTIFICIAL OPENING: ICD-10-PCS | Performed by: SPECIALIST

## 2020-09-25 PROCEDURE — 85025 COMPLETE CBC W/AUTO DIFF WBC: CPT

## 2020-09-25 PROCEDURE — C9803 HOPD COVID-19 SPEC COLLECT: HCPCS | Performed by: SPECIALIST

## 2020-09-25 PROCEDURE — 85027 COMPLETE CBC AUTOMATED: CPT

## 2020-09-25 PROCEDURE — U0004 COV-19 TEST NON-CDC HGH THRU: HCPCS

## 2020-09-25 PROCEDURE — 700105 HCHG RX REV CODE 258

## 2020-09-25 PROCEDURE — 4A1H74Z MONITORING OF PRODUCTS OF CONCEPTION, CARDIAC ELECTRICAL ACTIVITY, VIA NATURAL OR ARTIFICIAL OPENING: ICD-10-PCS | Performed by: SPECIALIST

## 2020-09-25 PROCEDURE — 700111 HCHG RX REV CODE 636 W/ 250 OVERRIDE (IP): Performed by: ANESTHESIOLOGY

## 2020-09-25 PROCEDURE — 304965 HCHG RECOVERY SERVICES

## 2020-09-25 PROCEDURE — 302128 INFUSION PUMP: Performed by: SPECIALIST

## 2020-09-25 PROCEDURE — 700111 HCHG RX REV CODE 636 W/ 250 OVERRIDE (IP)

## 2020-09-25 PROCEDURE — A9270 NON-COVERED ITEM OR SERVICE: HCPCS | Performed by: OBSTETRICS & GYNECOLOGY

## 2020-09-25 PROCEDURE — 303615 HCHG EPIDURAL/SPINAL ANESTHESIA FOR LABOR

## 2020-09-25 PROCEDURE — 59025 FETAL NON-STRESS TEST: CPT

## 2020-09-25 PROCEDURE — 90686 IIV4 VACC NO PRSV 0.5 ML IM: CPT | Performed by: SPECIALIST

## 2020-09-25 PROCEDURE — 3E0E77Z INTRODUCTION OF ELECTROLYTIC AND WATER BALANCE SUBSTANCE INTO PRODUCTS OF CONCEPTION, VIA NATURAL OR ARTIFICIAL OPENING: ICD-10-PCS | Performed by: SPECIALIST

## 2020-09-25 PROCEDURE — 59409 OBSTETRICAL CARE: CPT

## 2020-09-25 RX ORDER — ALUMINA, MAGNESIA, AND SIMETHICONE 2400; 2400; 240 MG/30ML; MG/30ML; MG/30ML
30 SUSPENSION ORAL EVERY 6 HOURS PRN
Status: DISCONTINUED | OUTPATIENT
Start: 2020-09-25 | End: 2020-09-25 | Stop reason: HOSPADM

## 2020-09-25 RX ORDER — BUPIVACAINE HYDROCHLORIDE 2.5 MG/ML
INJECTION, SOLUTION EPIDURAL; INFILTRATION; INTRACAUDAL
Status: COMPLETED
Start: 2020-09-25 | End: 2020-09-25

## 2020-09-25 RX ORDER — ROPIVACAINE HYDROCHLORIDE 2 MG/ML
INJECTION, SOLUTION EPIDURAL; INFILTRATION; PERINEURAL
Status: COMPLETED
Start: 2020-09-25 | End: 2020-09-25

## 2020-09-25 RX ORDER — SODIUM CHLORIDE, SODIUM LACTATE, POTASSIUM CHLORIDE, AND CALCIUM CHLORIDE .6; .31; .03; .02 G/100ML; G/100ML; G/100ML; G/100ML
250 INJECTION, SOLUTION INTRAVENOUS PRN
Status: DISCONTINUED | OUTPATIENT
Start: 2020-09-25 | End: 2020-09-25 | Stop reason: HOSPADM

## 2020-09-25 RX ORDER — SODIUM CHLORIDE, SODIUM LACTATE, POTASSIUM CHLORIDE, CALCIUM CHLORIDE 600; 310; 30; 20 MG/100ML; MG/100ML; MG/100ML; MG/100ML
INJECTION, SOLUTION INTRAVENOUS PRN
Status: DISCONTINUED | OUTPATIENT
Start: 2020-09-25 | End: 2020-09-27 | Stop reason: HOSPADM

## 2020-09-25 RX ORDER — SODIUM CHLORIDE, SODIUM LACTATE, POTASSIUM CHLORIDE, CALCIUM CHLORIDE 600; 310; 30; 20 MG/100ML; MG/100ML; MG/100ML; MG/100ML
INJECTION, SOLUTION INTRAVENOUS CONTINUOUS
Status: DISPENSED | OUTPATIENT
Start: 2020-09-25 | End: 2020-09-25

## 2020-09-25 RX ORDER — CITRIC ACID/SODIUM CITRATE 334-500MG
30 SOLUTION, ORAL ORAL EVERY 6 HOURS PRN
Status: DISCONTINUED | OUTPATIENT
Start: 2020-09-25 | End: 2020-09-25 | Stop reason: HOSPADM

## 2020-09-25 RX ORDER — SODIUM CHLORIDE, SODIUM LACTATE, POTASSIUM CHLORIDE, CALCIUM CHLORIDE 600; 310; 30; 20 MG/100ML; MG/100ML; MG/100ML; MG/100ML
INJECTION, SOLUTION INTRAVENOUS
Status: COMPLETED
Start: 2020-09-25 | End: 2020-09-25

## 2020-09-25 RX ORDER — OXYCODONE HYDROCHLORIDE 5 MG/1
5 TABLET ORAL EVERY 4 HOURS PRN
Status: DISCONTINUED | OUTPATIENT
Start: 2020-09-25 | End: 2020-09-27 | Stop reason: HOSPADM

## 2020-09-25 RX ORDER — MISOPROSTOL 200 UG/1
800 TABLET ORAL
Status: DISCONTINUED | OUTPATIENT
Start: 2020-09-25 | End: 2020-09-25 | Stop reason: HOSPADM

## 2020-09-25 RX ORDER — CARBOPROST TROMETHAMINE 250 UG/ML
250 INJECTION, SOLUTION INTRAMUSCULAR
Status: DISCONTINUED | OUTPATIENT
Start: 2020-09-25 | End: 2020-09-27 | Stop reason: HOSPADM

## 2020-09-25 RX ORDER — SODIUM CHLORIDE, SODIUM LACTATE, POTASSIUM CHLORIDE, AND CALCIUM CHLORIDE .6; .31; .03; .02 G/100ML; G/100ML; G/100ML; G/100ML
1000 INJECTION, SOLUTION INTRAVENOUS
Status: DISCONTINUED | OUTPATIENT
Start: 2020-09-25 | End: 2020-09-25 | Stop reason: HOSPADM

## 2020-09-25 RX ORDER — ROPIVACAINE HYDROCHLORIDE 2 MG/ML
INJECTION, SOLUTION EPIDURAL; INFILTRATION; PERINEURAL CONTINUOUS
Status: DISCONTINUED | OUTPATIENT
Start: 2020-09-25 | End: 2020-09-26 | Stop reason: HOSPADM

## 2020-09-25 RX ORDER — IBUPROFEN 600 MG/1
600 TABLET ORAL EVERY 6 HOURS PRN
Status: DISCONTINUED | OUTPATIENT
Start: 2020-09-25 | End: 2020-09-27 | Stop reason: HOSPADM

## 2020-09-25 RX ORDER — DOCUSATE SODIUM 100 MG/1
100 CAPSULE, LIQUID FILLED ORAL 2 TIMES DAILY PRN
Status: DISCONTINUED | OUTPATIENT
Start: 2020-09-25 | End: 2020-09-27 | Stop reason: HOSPADM

## 2020-09-25 RX ORDER — ONDANSETRON 2 MG/ML
4 INJECTION INTRAMUSCULAR; INTRAVENOUS EVERY 6 HOURS PRN
Status: DISCONTINUED | OUTPATIENT
Start: 2020-09-25 | End: 2020-09-27 | Stop reason: HOSPADM

## 2020-09-25 RX ORDER — ONDANSETRON 4 MG/1
4 TABLET, ORALLY DISINTEGRATING ORAL EVERY 6 HOURS PRN
Status: DISCONTINUED | OUTPATIENT
Start: 2020-09-25 | End: 2020-09-27 | Stop reason: HOSPADM

## 2020-09-25 RX ORDER — METHYLERGONOVINE MALEATE 0.2 MG/ML
0.2 INJECTION INTRAVENOUS
Status: DISCONTINUED | OUTPATIENT
Start: 2020-09-25 | End: 2020-09-27 | Stop reason: HOSPADM

## 2020-09-25 RX ORDER — BUPIVACAINE HYDROCHLORIDE 2.5 MG/ML
INJECTION, SOLUTION EPIDURAL; INFILTRATION; INTRACAUDAL PRN
Status: DISCONTINUED | OUTPATIENT
Start: 2020-09-25 | End: 2020-09-25 | Stop reason: SURG

## 2020-09-25 RX ADMIN — EPHEDRINE SULFATE 5 MG: 50 INJECTION INTRAVENOUS at 07:36

## 2020-09-25 RX ADMIN — SODIUM CHLORIDE, POTASSIUM CHLORIDE, SODIUM LACTATE AND CALCIUM CHLORIDE: 600; 310; 30; 20 INJECTION, SOLUTION INTRAVENOUS at 05:58

## 2020-09-25 RX ADMIN — SODIUM CHLORIDE, POTASSIUM CHLORIDE, SODIUM LACTATE AND CALCIUM CHLORIDE: 600; 310; 30; 20 INJECTION, SOLUTION INTRAVENOUS at 03:15

## 2020-09-25 RX ADMIN — ALUMINUM HYDROXIDE, MAGNESIUM HYDROXIDE, AND DIMETHICONE 30 ML: 400; 400; 40 SUSPENSION ORAL at 04:14

## 2020-09-25 RX ADMIN — IBUPROFEN 600 MG: 600 TABLET ORAL at 16:24

## 2020-09-25 RX ADMIN — INFLUENZA A VIRUS A/GUANGDONG-MAONAN/SWL1536/2019 CNIC-1909 (H1N1) ANTIGEN (FORMALDEHYDE INACTIVATED), INFLUENZA A VIRUS A/HONG KONG/2671/2019 (H3N2) ANTIGEN (FORMALDEHYDE INACTIVATED), INFLUENZA B VIRUS B/PHUKET/3073/2013 ANTIGEN (FORMALDEHYDE INACTIVATED), AND INFLUENZA B VIRUS B/WASHINGTON/02/2019 ANTIGEN (FORMALDEHYDE INACTIVATED) 0.5 ML: 15; 15; 15; 15 INJECTION, SUSPENSION INTRAMUSCULAR at 22:18

## 2020-09-25 RX ADMIN — BUPIVACAINE HYDROCHLORIDE 5 ML: 2.5 INJECTION, SOLUTION EPIDURAL; INFILTRATION; INTRACAUDAL; PERINEURAL at 05:23

## 2020-09-25 RX ADMIN — ROPIVACAINE HYDROCHLORIDE 200 MG: 2 INJECTION, SOLUTION EPIDURAL; INFILTRATION at 05:35

## 2020-09-25 RX ADMIN — FENTANYL CITRATE 100 MCG: 50 INJECTION INTRAMUSCULAR; INTRAVENOUS at 04:50

## 2020-09-25 RX ADMIN — IBUPROFEN 600 MG: 600 TABLET ORAL at 22:26

## 2020-09-25 RX ADMIN — OXYTOCIN 125 ML/HR: 10 INJECTION, SOLUTION INTRAMUSCULAR; INTRAVENOUS at 13:12

## 2020-09-25 RX ADMIN — SODIUM CHLORIDE, POTASSIUM CHLORIDE, SODIUM LACTATE AND CALCIUM CHLORIDE: 600; 310; 30; 20 INJECTION, SOLUTION INTRAVENOUS at 08:05

## 2020-09-25 RX ADMIN — ROPIVACAINE HYDROCHLORIDE 200 MG: 2 INJECTION, SOLUTION EPIDURAL; INFILTRATION; PERINEURAL at 05:35

## 2020-09-25 RX ADMIN — EPHEDRINE SULFATE 5 MG: 50 INJECTION INTRAVENOUS at 06:50

## 2020-09-25 RX ADMIN — FENTANYL CITRATE 100 MCG: 50 INJECTION INTRAMUSCULAR; INTRAVENOUS at 03:45

## 2020-09-25 RX ADMIN — OXYTOCIN 1 MILLI-UNITS/MIN: 10 INJECTION, SOLUTION INTRAMUSCULAR; INTRAVENOUS at 10:20

## 2020-09-25 RX ADMIN — FENTANYL CITRATE 100 MCG: 50 INJECTION, SOLUTION INTRAMUSCULAR; INTRAVENOUS at 05:23

## 2020-09-25 SDOH — ECONOMIC STABILITY: TRANSPORTATION INSECURITY
IN THE PAST 12 MONTHS, HAS LACK OF TRANSPORTATION KEPT YOU FROM MEETINGS, WORK, OR FROM GETTING THINGS NEEDED FOR DAILY LIVING?: NO

## 2020-09-25 SDOH — ECONOMIC STABILITY: FOOD INSECURITY: WITHIN THE PAST 12 MONTHS, THE FOOD YOU BOUGHT JUST DIDN'T LAST AND YOU DIDN'T HAVE MONEY TO GET MORE.: NEVER TRUE

## 2020-09-25 SDOH — ECONOMIC STABILITY: FOOD INSECURITY: WITHIN THE PAST 12 MONTHS, YOU WORRIED THAT YOUR FOOD WOULD RUN OUT BEFORE YOU GOT MONEY TO BUY MORE.: NEVER TRUE

## 2020-09-25 SDOH — ECONOMIC STABILITY: TRANSPORTATION INSECURITY
IN THE PAST 12 MONTHS, HAS THE LACK OF TRANSPORTATION KEPT YOU FROM MEDICAL APPOINTMENTS OR FROM GETTING MEDICATIONS?: NO

## 2020-09-25 ASSESSMENT — LIFESTYLE VARIABLES
TOTAL SCORE: 0
CONSUMPTION TOTAL: NEGATIVE
DOES PATIENT WANT TO STOP DRINKING: NO
HOW MANY TIMES IN THE PAST YEAR HAVE YOU HAD 5 OR MORE DRINKS IN A DAY: 0
TOTAL SCORE: 0
EVER_SMOKED: YES
EVER FELT BAD OR GUILTY ABOUT YOUR DRINKING: NO
ON A TYPICAL DAY WHEN YOU DRINK ALCOHOL HOW MANY DRINKS DO YOU HAVE: 0
TOTAL SCORE: 0
AVERAGE NUMBER OF DAYS PER WEEK YOU HAVE A DRINK CONTAINING ALCOHOL: 0
ALCOHOL_USE: NO
EVER HAD A DRINK FIRST THING IN THE MORNING TO STEADY YOUR NERVES TO GET RID OF A HANGOVER: NO
HAVE YOU EVER FELT YOU SHOULD CUT DOWN ON YOUR DRINKING: NO
EVER_SMOKED: YES
PACK_YEARS: 10
HAVE PEOPLE ANNOYED YOU BY CRITICIZING YOUR DRINKING: NO

## 2020-09-25 ASSESSMENT — EDINBURGH POSTNATAL DEPRESSION SCALE (EPDS)
I HAVE BEEN ANXIOUS OR WORRIED FOR NO GOOD REASON: YES, SOMETIMES
I HAVE FELT SAD OR MISERABLE: NOT VERY OFTEN
I HAVE BEEN SO UNHAPPY THAT I HAVE BEEN CRYING: NO, NEVER
I HAVE BLAMED MYSELF UNNECESSARILY WHEN THINGS WENT WRONG: NOT VERY OFTEN
I HAVE FELT SCARED OR PANICKY FOR NO GOOD REASON: NO, NOT AT ALL
I HAVE BEEN SO UNHAPPY THAT I HAVE HAD DIFFICULTY SLEEPING: NOT AT ALL
THE THOUGHT OF HARMING MYSELF HAS OCCURRED TO ME: NEVER
THINGS HAVE BEEN GETTING ON TOP OF ME: NO, I HAVE BEEN COPING AS WELL AS EVER
I HAVE LOOKED FORWARD WITH ENJOYMENT TO THINGS: AS MUCH AS I EVER DID
I HAVE BEEN ABLE TO LAUGH AND SEE THE FUNNY SIDE OF THINGS: AS MUCH AS I ALWAYS COULD

## 2020-09-25 ASSESSMENT — PAIN DESCRIPTION - PAIN TYPE
TYPE: ACUTE PAIN

## 2020-09-25 ASSESSMENT — COPD QUESTIONNAIRES
IN THE PAST 12 MONTHS DO YOU DO LESS THAN YOU USED TO BECAUSE OF YOUR BREATHING PROBLEMS: DISAGREE/UNSURE
HAVE YOU SMOKED AT LEAST 100 CIGARETTES IN YOUR ENTIRE LIFE: NO/DON'T KNOW
DO YOU EVER COUGH UP ANY MUCUS OR PHLEGM?: NO/ONLY WITH OCCASIONAL COLDS OR INFECTIONS
DURING THE PAST 4 WEEKS HOW MUCH DID YOU FEEL SHORT OF BREATH: NONE/LITTLE OF THE TIME

## 2020-09-25 ASSESSMENT — PATIENT HEALTH QUESTIONNAIRE - PHQ9
SUM OF ALL RESPONSES TO PHQ9 QUESTIONS 1 AND 2: 0
1. LITTLE INTEREST OR PLEASURE IN DOING THINGS: NOT AT ALL
2. FEELING DOWN, DEPRESSED, IRRITABLE, OR HOPELESS: NOT AT ALL
2. FEELING DOWN, DEPRESSED, IRRITABLE, OR HOPELESS: NOT AT ALL
1. LITTLE INTEREST OR PLEASURE IN DOING THINGS: NOT AT ALL
SUM OF ALL RESPONSES TO PHQ9 QUESTIONS 1 AND 2: 0

## 2020-09-25 ASSESSMENT — PAIN SCALES - GENERAL: PAIN_LEVEL: 3

## 2020-09-25 NOTE — PROGRESS NOTES
0232- Pt presented to labor and delivery reporting leaking of fluid and contractions. , EDC , 39.5. Pt ambulated to [place] [id number]. Ej LEVY, at bedside.     0245- Pt placed on monitors and oriented to room. Pt reporting frequent contractions, q2-3 minutes, increased in frequency from earlier. LOF began at 0140, clear fluid, see flowsheets. Sterile speculum exam performed, clear amniotic fluid noted by this RN. SVE, see flowsheets. Pt denies VB. Positive FM. Pt reporting she had a c/s in  and successful  in , and has already signed a consent to TOLAC. Pt desires to have an epidural for pain control in labor.     0310- Dr. Ochoa called. Report given on patient. Admit orders obtained. Dr. Ochoa will call Dr. Marinelli to be in house since patient is a TOLAC.     0320- RN called Dr. Marinelli to let him know that there is no GBS hard copy that can be located. Pt reporting GBS was negative. Dr. Marinelli stating he is on his way to the hospital soon.     0338- Emergent c/s going back to OR in five minutes per Dr. Mary, pt unable to get epidural at this time. Pt provided education on situation, verbalized understanding. Pt would like to have fentanyl until she can get an epidural.     0400- Dr. Marinelli at bedside. Dr. Marinelli reminded that there is no GBS result, physician stated he will try and locate result.     0444- Dr. Marinelli okay with giving more fentanyl at this time, anesthesia still in OR. RN reported late decels and variable decels with moderate variability, tracing reviewed by physician. Physician has GBS result on computer, unable to print, negative GBS result noted by this RN.     0515- Dr. Mary at bedside for epidural placement. Time out called, see flowsheets. Test dose given without complication.     0542- Prolonged decel from [id number], see flowsheets for position changes. IVF bolus from epidural still running. Additional RN's called to bedside for help. SVE, see flowsheets.     0544-   "Yves updated on prolonged decel and SVE and that a forebag is present. IVF open still from epidural.  Physican will come by to rupture forebag and place IUPC. Physician believes decel is from epidural placement.    0556- Dr. Marinelli at bedside. SVE and forebag ruptured. IUPC and FSE placed. Tracing reviewed by physician.    0644- Pt is hypotensive, cuff repositioned and BP reassessed, but BP remains low. Pt reporting \"feeling tired\". RN attempted to call Dr. Mary, but went to voicemail. IVF opened, see MAR. Ephedrine given, see MAR. BP recovered after ephedrine administration. Pt reporting that she is also feeling better.    0654- RN called Dr. Marinelli to update physician that pt is having repetitive variables (deep, down to 60s), but has moderate variability. MD would like RN to check cervix. Dr. Marinelli called back and reported SVE, no change. RN questioned order for amnioinfusion? Order for amnioinfusion obtained, LR, 400 cc bolus, then 100 ml/hour. Cyndi KELLER at bedside, report given. Cyndi KELLER will call anesthesia and update that ephedrine was given, and she will start amnioinfusion.        "

## 2020-09-25 NOTE — PROGRESS NOTES
The patient just received a labor epidural.  Her epidural appears to be functioning well.  I just checked her cervix and found her cervix to be about 5 to 6 cm dilated and 90% effaced and the station is -2 station.  A bulging bag of water was palpated and artificial rupture of membranes was performed and clear amniotic fluid was observed.  An intrauterine pressure catheter was then placed and then a fetal scalp electrode was placed.  The fetal heart tracing is category 1.  Mina Mrainelli MD

## 2020-09-25 NOTE — PROGRESS NOTES
0608- Report received from ARIANNA Garcia. Pt repositioned do to deep variables with contraction. LR bolus running. Epidural in place providing great pain relief at this time. Deal in place and draining clear yellow urine to gravity. ARIANNA Garcia at bedside and calling Dr Marinelli to discuss repetitive variable with contractions. (see Radha's note). POC discussed and assumed.   0703- Amnio infusion started per order of 400 ml bolus with 100 ml/hr infusion post bolus. Pt educated. Pt very nervous about situation. Pt reassured and calmed.   0727- Dr Marinelli at bedside. SVE 6/90/-2. Amnio infusion bolus of 400 ml complete and 100 ml/hr started. Pt positioned on her left side. Dr Marinelli aware that FSE pulled out during previous position change and he does not feel the need to place a new one at this time. Heart tones easily detected with EFM at this time.   0736- Pt feeling lightheaded. /50. Another 5mg dose of Ephedrine given (see MAR).  0745- Pt /51 but feeling better. Variables have lessened in severity.  Pt repositioned onto left side with peanut ball  0940- Pt c/o pressure. SVE 6/90/-1. Pt repositioned onto right side with peanut ball.   1006- Dr Marinelli called and updated on pt status. Informed him that the variables have improved. Currently Moderate variability with accels and no variables with contractions. Contractions are about every 3-5 and intensity between 40-60. Orders to start pitocin at 1 william-unit/min and increase by 1 every 30 mins.   1020- Pitocin started and pt education provided.   1143-Pt c/o increasing pressure. SVE complete/+2. Message left with Dr Marinelli  1155- Dr Marinelli returned call and is on his way.   1158- Pitocin stopped.  1207- Dr Marinelli at bedside. Pt positioned in stirups.   1211- Pushing started  1217-  of viable male, apgars 8/9.   1223- Delivery of intact placenta. Pitocin bolus started per order.   1230- Fundus firm 2 below U with light Lochia rubra. Pt repositioned for  comfort and new pads and ice pack applied.   1330- Epidural cath d/c'd with tip intact. Pt enjoying food at this time.

## 2020-09-25 NOTE — ANESTHESIA PROCEDURE NOTES
Epidural Block    Date/Time: 9/25/2020 5:19 AM  Performed by: Adair Mary M.D.  Authorized by: Adair Mary M.D.     Patient Location:  OB  Start Time:  9/25/2020 5:19 AM  End Time:  9/25/2020 5:23 AM  Reason for Block: labor analgesia    patient identified, IV checked, site marked, risks and benefits discussed, surgical consent, monitors and equipment checked, pre-op evaluation and timeout performed    Patient Position:  Sitting  Prep: ChloraPrep, patient draped and sterile technique    Monitoring:  Blood pressure, continuous pulse oximetry and heart rate  Approach:  Midline  Location:  L4-L5  Injection Technique:  PETAR saline  Skin infiltration:  Lidocaine  Strength:  1%  Dose:  3ml  Needle Type:  Tuohy  Needle Gauge:  17 G  Needle Length:  3.5 in  Loss of resistance::  4  Catheter Size:  19 G  Catheter at Skin Depth:  9  Test Dose Result:  Negative   easy

## 2020-09-25 NOTE — PROGRESS NOTES
Assumed care. Assessment done. No change from previous assessment on admit. Denies needs at this time

## 2020-09-25 NOTE — ANESTHESIA PREPROCEDURE EVALUATION
Relevant Problems   ANESTHESIA (within normal limits)      PULMONARY (within normal limits)      NEURO (within normal limits)      CARDIAC (within normal limits)      GI (within normal limits)       (within normal limits)      ENDO (within normal limits)      OB   (+) History of  delivery with successful , desires TOLAC       Physical Exam    Airway   Mallampati: II  TM distance: >3 FB  Neck ROM: full       Cardiovascular - normal exam  Rhythm: regular  Rate: normal  (-) murmur     Dental - normal exam           Pulmonary - normal exam  Breath sounds clear to auscultation     Abdominal    Neurological - normal exam                 Anesthesia Plan    ASA 2       Plan - epidural   Neuraxial block will be labor analgesia              Pertinent diagnostic labs and testing reviewed    Informed Consent:    Anesthetic plan and risks discussed with patient.

## 2020-09-25 NOTE — PROGRESS NOTES
24 yo  39w4d    Pt presents to L&D reporting regular contractions since 8pm. Denies VB, LOF. +FM. Pt has a history of C/Sx1 and VBACx1. Desires TOLAC. SVE 3/60/-2 at 2316. Dr. Ochoa notified of pt arrival and assessment. Received orders to monitor x1 hour and repeat cervical exam.   Repeat SVE unchanged. Discharge orders received.   Pt given discharge instructions and discharged in stable condition.

## 2020-09-25 NOTE — L&D DELIVERY NOTE
Normal spontaneous vaginal delivery (vaginal birth after previous  section).  Live term male .  Apgar scores 8 and 9 at 1 and 5 minutes respectively.  [place] weight not yet measured or not yet been made available to me but estimated to be approximately 3,200 grams.  Baby was delivered over an intact perineum under continuous epidural anesthesia.  The placenta was then simply delivered and examined and found to be complete.  Next examination of the vulva and vaginal mucosa reveals no vulvar or vaginal lacerations.  Bimanual vaginal exam was performed and revealed that there were no gauze sponges in the vagina and that the uterus was firm.  The estimated blood loss is approximately 100 cc.  Mina Marinelli MD

## 2020-09-25 NOTE — LACTATION NOTE
This note was copied from a baby's chart.  Baby 39.5 weeks, baby 2 hours old, latched in L&D, MOB experienced with breastfeeding. Baby swaddled asleep in mother's arms. Baby placed skin to skin, baby began cueing, assisted baby to right breast, easily & quickly obtained deep latch- see latch assessment score.     Teaching on hunger cues, breastfeeding on cue or by 4 hours from last feed or a minim 8x/24 hours, importance of skin to skin, positioning baby at breast nipple to nose & cluster feeding. Encouraged mother to hand express & spoon feed 4-5 times during the day until milk supply comes in.     Breastfeeding plan:  Breastfeed on cue a minimum of 8x/24 hours or by 4 hours from last feed.

## 2020-09-25 NOTE — H&P
Bandar Trujillo          YOB: 1995  Date of today's admission: 2020  Facility: [facility] Labor and Delivery    ID: The patient is a very pleasant 25-year-old multipara ( 5, para 2, with 1 previous  section followed by 1 successful vaginal birth after previous  section) who is today 39 weeks and 5 days gestation.    Chief Complaint: The patient presents early this morning with complaints of leakage of fluid per vagina.    History of Present Illness: The patient has had her prenatal care with myself during the course of this pregnancy.  She has also seen perinatology (Dr. Campos) during the course of this pregnancy.  She has had a previous  section.  This was followed by one successful .  She has told me repeatedly during the course of her prenatal care with this pregnancy that she wishes to have a  with this pregnancy.  I have discussed with her and explained to her in detail and at length the risks and benefits and alternatives of trial of labor after previous  section and attempt to have a vaginal birth after previous  section.  I discussed with her in detail and at length the subject of uterine rupture.  After our discussions and after answering her questions she told me again that she very much wishes to have a trial of labor and attempt to have a vaginal birth with this pregnancy.    Past Medical History: The patient that she has no medical illnesses.    Past Surgical History: The patient has had a previous  section.    Medications: The patient says that she takes no medications other than for vitamins.    Allergies: The patient says that she has no known drug allergies.    Social History: The patient denies smoking.  She denies consuming alcoholic beverages.  She denies the use of recreational drugs.    Review of Systems  General: The patient denies any fevers, chills, sweats.  Pulmonary: The  patient denies any coughing, wheezing, chest pain, shortness of breath.  Cardiovascular: The patient denies any palpitations, dyspnea, chest pain.  Gastrointestinal: The patient denies any nausea, vomiting, diarrhea, constipation, hematochezia, melena, history of hepatitis, history of jaundice.  Genitourinary: The patient complains of leakage of fluid per vagina.  She also complains of frequent and painful uterine contractions.  Musculoskeletal: The patient denies any arthralgias or myalgias other than for hip pain and low back pain.   Neurological: No headaches or syncope or seizures.     Physical Exam:   Vital Signs: The patient's vital signs are stable and she is afebrile.  General: The patient appears well developed and well nourished and relaxed and alert and comfortable and in no apparent distress.    HEENT :  Normo-cephalic, atraumatic, pupils equal, round, reactive to light and accommodation, extra ocular motions intact, pharynx clear; there is no thyromegaly. There is no cervical lymphadenopathy.  Chest: Heart regular rate and rhythm, with no murmurs or rubs or gallops; the lungs are clear to auscultation bilaterally.  Abdomen: The abdomen is gravid and is about 9 months size.   Pelvic: The cervix is 5 cm dilated.  Evidence of spontaneous rupture of membranes is noted on exam.  Extremities: No clubbing or cyanosis or edema.   Neurological: non-focal.     Assessment:   1.)  Intrauterine pregnancy at 39 weeks and 5 days gestation.  2.)  Previous  section followed by 1 successful  and the patient wishes to have a  with this pregnancy.  3.)  Spontaneous rupture of membranes.  4.)  Labor.    Plan:   I have discussed with the patient in detail and at length the risks and benefits and alternatives of trial of labor after previous  section and attempt to have a vaginal birth after previous  section.  After our discussions and after answering her questions the patient stated that she  very much wished to have a trial of labor and try to have another .  We will continue electronic fetal monitoring and give analgesia as needed and we will anticipate an obstetrical vaginal delivery (vaginal birth after previous  section).            ________________________  Mina Marinelli M.D.

## 2020-09-25 NOTE — CARE PLAN
Problem: Infection  Goal: Will remain free from infection  Outcome: PROGRESSING AS EXPECTED    No s/s of infection. VSS. Afebrile.     Problem: Pain  Goal: Alleviation of Pain or a reduction in pain to the patient's comfort goal  Outcome: PROGRESSING AS EXPECTED    Pt desires to have an epidural for pain control in labor. See progress note. Pt provided education on pain management options, verbalized understanding.

## 2020-09-25 NOTE — PROGRESS NOTES
Stable mom.  To S340 per w/c.  Pleasant.  Assessment done.  Denies any pain on transfer.  Doing well.

## 2020-09-26 PROCEDURE — 770002 HCHG ROOM/CARE - OB PRIVATE (112)

## 2020-09-26 PROCEDURE — 700102 HCHG RX REV CODE 250 W/ 637 OVERRIDE(OP): Performed by: OBSTETRICS & GYNECOLOGY

## 2020-09-26 PROCEDURE — A9270 NON-COVERED ITEM OR SERVICE: HCPCS | Performed by: OBSTETRICS & GYNECOLOGY

## 2020-09-26 RX ADMIN — OXYCODONE 5 MG: 5 TABLET ORAL at 18:35

## 2020-09-26 RX ADMIN — OXYCODONE 5 MG: 5 TABLET ORAL at 23:26

## 2020-09-26 RX ADMIN — IBUPROFEN 600 MG: 600 TABLET ORAL at 20:24

## 2020-09-26 RX ADMIN — IBUPROFEN 600 MG: 600 TABLET ORAL at 04:41

## 2020-09-26 RX ADMIN — IBUPROFEN 600 MG: 600 TABLET ORAL at 13:28

## 2020-09-26 RX ADMIN — OXYCODONE 5 MG: 5 TABLET ORAL at 04:41

## 2020-09-26 RX ADMIN — OXYCODONE 5 MG: 5 TABLET ORAL at 00:20

## 2020-09-26 RX ADMIN — OXYCODONE 5 MG: 5 TABLET ORAL at 08:28

## 2020-09-26 ASSESSMENT — PAIN DESCRIPTION - PAIN TYPE
TYPE: ACUTE PAIN

## 2020-09-26 NOTE — DISCHARGE INSTRUCTIONS
POSTPARTUM DISCHARGE INSTRUCTIONS FOR MOM    YOB: 1995   Age: 25 y.o.               Admit Date: 9/25/2020     Discharge Date: 9/26/2020  Attending Doctor:  Mina Marinelli M.D.                  Allergies:  Tape    Discharged to home by car. Discharged via wheelchair, hospital escort: Yes.  Special equipment needed: Not Applicable  Belongings with: Personal  Be sure to schedule a follow-up appointment with your primary care doctor or any specialists as instructed.     Discharge Plan:   Diet Plan: Discussed  Activity Level: Discussed  Smoking Cessation Offered: Patient Counseled  Confirmed Follow up Appointment: Appointment Scheduled  Confirmed Symptoms Management: Discussed  Medication Reconciliation Updated: No (Comments)  Influenza Vaccine Indication: Indicated: 9 to 64 years of age  Influenza Vaccine Given - only chart on this line when given: Influenza Vaccine Given (See MAR)    REASONS TO CALL YOUR OBSTETRICIAN:  1.   Persistent fever or shaking chills (Temperature higher than 100.4)  2.   Heavy bleeding (soaking more than 1 pad per hour); Passing clots  3.   Foul odor from vagina  4.   Mastitis (Breast infection; breast pain, chills, fever, redness)  5.   Urinary pain, burning or frequency  6.   Severe depression longer than 24 hours    HAND WASHING  · Prior to handling the baby.  · Before breastfeeding or bottle feeding baby.  · After using the bathroom or changing the baby's diaper.    VAGINAL CARE  · Nothing inside vagina for 6 weeks: no sexual intercourse, tampons or douching.  · Bleeding may continue for 2-4 weeks.  Amount may vary.    · Call your physician for heavy bleeding which means soaking more than 1 pad per hour    BIRTH CONTROL  · It is possible to become pregnant at any time after delivery and while breastfeeding.  · Plan to discuss a method of birth control with your physician at your follow up visit. visit.    DIET AND ELIMINATION  · Eating more fiber (bran cereal, fruits, and  "vegetables) and drinking plenty of fluids will help to avoid constipation.  · Urinary frequency after childbirth is normal.    POSTPARTUM BLUES  During the first few days after birth, you may experience a sense of the \"blues\" which may include impatience, irritability or even crying.  These feeling come and go quickly.  However, as many as 1 in 10 women experience emotional symptoms known as postpartum depression.    Postpartum depression:  May start as early as the second or third day after delivery or take several weeks or months to develop.  Symptoms of \"blues\" are present, but are more intense:  Crying spells; loss of appetite; feelings of hopelessness or loss of control; fear of touching the baby; over concern or no concern at all about the baby; little or no concern about your own appearance/caring for yourself; and/or inability to sleep or excessive sleeping.  Contact your physician if you are experiencing any of these symptoms.    Crisis Hotline:  · [place] Crisis Hotline:  6-465-GYUCOMI  Or 1-585.506.6323  · [place] Crisis Hotline:  1-297.379.9520  Or 449-422-6627    PREVENTING SHAKEN BABY:  If you are angry or stressed, PUT THE BABY IN THE CRIB, step away, take some deep breaths, and wait until you are calm to care for the baby.  DO NOT SHAKE THE BABY.  You are not alone, call a supporter for help.    · Crisis Call Center 24/7 crisis line 598-497-0146 or 1-527.108.8333  · You can also text them, text \"ANSWER\" to 841269    QUIT SMOKING/TOBACCO USE:  I understand the use of any tobacco products increases my chance of suffering from future heart disease and could cause other illnesses which may shorten my life. Quitting the use of tobacco products is the single most important thing I can do to improve my health. For further information on smoking / tobacco cessation call a Toll Free Quit Line at 1-520.892.7724 (*[employer] [place]) or 1-640.720.9157 (American Lung Association) or you can access the web " based program at www.lungusa.org.    · [place] Tobacco Users Help Line:  (536) 378-2433       Toll Free: 1-603.679.9138  · Quit Tobacco Program [facility] (936)849-0763    DEPRESSION / SUICIDE RISK:  As [facility] are [facility], it is important to learn how to keep safe from harming yourself.    Recognize the warning signs:  · Abrupt changes in personality, positive or negative- including increase in energy   · Giving away possessions  · Change in eating patterns- significant weight changes-  positive or negative  · Change in sleeping patterns- unable to sleep or sleeping all the time   · Unwillingness or inability to communicate  · Depression  · Unusual sadness, discouragement and loneliness  · Talk of wanting to die  · Neglect of personal appearance   · Rebelliousness- reckless behavior  · Withdrawal from people/activities they love  · Confusion- inability to concentrate     If you or a loved one observes any of these behaviors or has concerns about self-harm, here's what you can do:  · Talk about it- your feelings and reasons for harming yourself  · Remove any means that you might use to hurt yourself (examples: pills, rope, extension cords, firearm)  · Get professional help from the community (Mental Health, Substance Abuse, psychological counseling)  · Do not be alone:Call your Safe Contact- someone whom you trust who will be there for you.  · Call your local CRISIS HOTLINE 502-4012 or 610-656-6131  · Call your local Children's Mobile Crisis Response Team [place] (469) 768-2042 or www.[place]  · Call the toll free National Suicide Prevention Hotlines   · National Suicide Prevention Lifeline 857-635-ZNPL (6619)  · National Hope Line Network 800-SUICIDE (645-7412)    DISCHARGE SURVEY:  Thank you for choosing [facility].  We hope we provided you with very good care.  You may be receiving a survey in the mail.  Please fill it out.  Your opinion is  valuable to us.    ADDITIONAL EDUCATIONAL MATERIALS GIVEN TO PATIENT: INJOY education leodan        My signature on this form indicates that:  1.  I have reviewed and understand the above information  2.  My questions regarding this information have been answered to my satisfaction.  3.  I have formulated a plan with my discharge nurse to obtain my prescribed medication for home.

## 2020-09-26 NOTE — DISCHARGE SUMMARY
Discharge Summary:      Bandar Trujillo    Admit Date:   2020  Discharge Date:  2020     Admitting diagnosis:  Pregnancy  Indication for care in labor or delivery  Discharge Diagnosis: Status post .  Pregnancy Complications: none  Tubal Ligation:  no        History:  History reviewed. No pertinent past medical history.  OB History    Para Term  AB Living   5 3 3   2 3   SAB TAB Ectopic Molar Multiple Live Births   1 1     0 3      # Outcome Date GA Lbr Raphael/2nd Weight Sex Delivery Anes PTL Lv   5 Term 20 39w5d / 00:34 3.55 kg (7 lb 13.2 oz) M Vag-Spont EPI N JEFFERY   4 TAB 2017     TAB      3 Term 12/10/16 39w1d  3.005 kg (6 lb 10 oz) F Vag-Spont EPI N JEFFERY      Birth Comments: . pt states no complications   2 SAB 2016 6w0d    SAB         Birth Comments: Pt. states no D/C needed.    1 Term 10/22/14 39w2d  2.92 kg (6 lb 7 oz) F CS-Unspec EPI, Spinal N JEFFERY      Birth Comments: FAILURE TO PROGRESS         Tape  Patient Active Problem List    Diagnosis Date Noted   • Polyhydramnios in third trimester 2020   • Encounter for supervision of high risk pregnancy in second trimester, antepartum 2020   • History of  delivery with successful , desires TOLAC 2020        Hospital Course:   25 y.o. , now para 3, was admitted with the above mentioned diagnosis, underwent Active Labor, . Patient postpartum course was unremarkable, with progressive advancement in diet , ambulation and toleration of oral analgesia. Patient without complaints today and desires discharge.      Vitals:    20 1430 20 1800 20 2200 20 0600   BP: 110/63 108/61 104/61 109/69   Pulse: 85 78 86 68   Resp: 17 18 18 16   Temp: 36.3 °C (97.4 °F) 36.2 °C (97.1 °F) 36.3 °C (97.4 °F) 36.7 °C (98 °F)   TempSrc: Temporal Temporal Temporal Temporal   SpO2: 95% 93% 96% 98%   Weight:       Height:           Current Facility-Administered Medications   Medication Dose    • ondansetron (ZOFRAN ODT) dispertab 4 mg  4 mg    Or   • ondansetron (ZOFRAN) syringe/vial injection 4 mg  4 mg   • oxytocin (PITOCIN) infusion (for postpartum)  2,000 mL/hr    Followed by   • oxytocin (PITOCIN) infusion (for postpartum)   mL/hr   • ibuprofen (MOTRIN) tablet 600 mg  600 mg   • oxyCODONE immediate-release (ROXICODONE) tablet 5 mg  5 mg   • ropivacaine 0.2 % (NAROPIN) injection     • oxytocin (PITOCIN) 20 UNITS/1000ML LR (induction of labor)  0.5-20 william-units/min   • LR infusion     • tetanus-dipth-acell pertussis (Tdap) inj 0.5 mL  0.5 mL   • measles, mumps and rubella vaccine (MMR) injection 0.5 mL  0.5 mL   • PRN oxytocin (PITOCIN) (20 Units/1000 mL) PRN for excessive uterine bleeding - See Admin Instr  125-999 mL/hr   • methylergonovine (METHERGINE) injection 0.2 mg  0.2 mg   • carboPROST (HEMABATE) injection 250 mcg  250 mcg   • docusate sodium (COLACE) capsule 100 mg  100 mg       Exam:  Breast Exam: negative  Abdomen: Abdomen soft, non-tender. BS normal. No masses,  No organomegaly  Fundus Non Tender: yes  Perineum: perineum intact  Extremity: extremities, peripheral pulses and reflexes normal     Labs:  Recent Labs     09/25/20  0310 09/25/20  2152   WBC 9.3 11.3*   RBC 4.13* 3.89*   HEMOGLOBIN 12.5 11.7*   HEMATOCRIT 36.2* 34.6*   MCV 87.7 88.9   MCH 30.3 30.1   MCHC 34.5 33.8   RDW 45.6 47.0   PLATELETCT 180 169   MPV 11.3 10.9        Activity:   Discharge to home  Pelvic Rest x 6 weeks    Assessment:  normal postpartum course  Discharge Assessment: Taking adequate diet and fluids     Follow up: Dr Marinelli 6 weeks PP      Discharge Meds:   No current outpatient medications on file.       Chel Austin M.D.

## 2020-09-26 NOTE — CARE PLAN
Problem: Alteration in comfort related to episiotomy, vaginal repair and/or after birth pains  Goal: Patient is able to ambulate, care for self and infant  Outcome: PROGRESSING AS EXPECTED  Note: Pain controlled with motrin and norco per order     Problem: Potential knowledge deficit related to lack of understanding of self and  care  Goal: Patient will verbalize understanding of self and infant care  Outcome: PROGRESSING AS EXPECTED  Note: Lactation working with mom, this is her second child

## 2020-09-26 NOTE — ANESTHESIA TIME REPORT
Anesthesia Start and Stop Event Times     Date Time Event    9/25/2020 0516 Ready for Procedure     0516 Anesthesia Start     1217 Anesthesia Stop        Responsible Staff  09/25/20    Name Role Begin End    Adair Mary M.D. Anesth 0516 0702    Sammi Enriquez M.D. Anesth 0702 1217        Preop Diagnosis (Free Text):  Pre-op Diagnosis             Preop Diagnosis (Codes):    Post op Diagnosis  Distress from pain in labor      Premium Reason  A. 3PM - 7AM    Comments:

## 2020-09-26 NOTE — ANESTHESIA POSTPROCEDURE EVALUATION
Patient: Bandar Trujillo    Procedure Summary     Date: 09/25/20 Room / Location:     Anesthesia Start: 0516 Anesthesia Stop: 1217    Procedure: Labor Epidural Diagnosis:     Scheduled Providers:  Responsible Provider: Sammi Enriquez M.D.    Anesthesia Type: epidural ASA Status: 2          Final Anesthesia Type: epidural  Last vitals  BP   Blood Pressure: 108/61    Temp   36.2 °C (97.1 °F)    Pulse   Pulse: 78   Resp   18    SpO2   93 %      Anesthesia Post Evaluation    Patient location during evaluation: bedside  Patient participation: complete - patient participated  Level of consciousness: awake  Pain score: 3    Airway patency: patent  Anesthetic complications: no  Cardiovascular status: adequate  Respiratory status: acceptable  Hydration status: acceptable    PONV: none           Nurse Pain Score: 3 (NPRS)

## 2020-09-26 NOTE — CARE PLAN
Problem: Communication  Goal: The ability to communicate needs accurately and effectively will improve  Outcome: MET     Problem: Safety  Goal: Will remain free from injury  Outcome: MET  Goal: Will remain free from falls  Outcome: MET     Problem: Infection  Goal: Will remain free from infection  Outcome: MET     Problem: Venous Thromboembolism (VTW)/Deep Vein Thrombosis (DVT) Prevention:  Goal: Patient will participate in Venous Thrombosis (VTE)/Deep Vein Thrombosis (DVT)Prevention Measures  Outcome: MET     Problem: Bowel/Gastric:  Goal: Normal bowel function is maintained or improved  Outcome: MET  Goal: Will not experience complications related to bowel motility  Outcome: MET     Problem: Knowledge Deficit  Goal: Knowledge of disease process/condition, treatment plan, diagnostic tests, and medications will improve  Outcome: MET  Goal: Knowledge of the prescribed therapeutic regimen will improve  Outcome: MET     Problem: Discharge Barriers/Planning  Goal: Patient's continuum of care needs will be met  Outcome: MET     Problem: Safety  Goal: Free from accidental injury  Outcome: MET  Goal: Free from intentional harm  Outcome: MET  Goal: Free from self harm  Outcome: MET  Goal: Isolation Precautions for patient and staff safety  Outcome: MET     Problem: Knowledge Deficit  Goal: Patient/Support person demonstrates understanding regarding the progression of labor, available options and participates in decision-making process  Outcome: MET     Problem: Psychosocial needs  Goal: Spiritual needs incorporated in hospitalization  Outcome: MET  Goal: Cultural needs incorporated in hospitalization  Outcome: MET  Goal: Anxiety reduction  Outcome: MET     Problem: Discharge Barriers/Planning  Goal: Patient's Continuum of care needs are met  Outcome: MET     Problem: Pain  Goal: Alleviation of Pain or a reduction in pain to the patient's comfort goal  Outcome: MET  Goal: Patient will have relaxed facial expressions and be  able to rest between uterine contractions  Outcome: MET     Problem: Risk for Fluid Imbalance  Goal: Promotion of Fluid Balance  Outcome: MET     Problem: Risk for Infection, Impaired Wound Healing  Goal: Remain free from signs and symptoms of infection  Outcome: MET  Goal: Promotion of Wound Healing  Outcome: MET     Problem: Risk for injury  Goal: Patient and fetus will be free of preventable injury/complications  Outcome: MET  Goal: Fetus will be free of preventable trauma or other complications  Outcome: MET     Problem: Skin Integrity  Goal: Risk for impaired skin integrity will decrease  Outcome: MET     Problem: Altered physiologic condition related to postoperative  delivery  Goal: Patient physiologically stable as evidenced by normal lochia, palpable uterine involution and vital signs within normal limits  Outcome: MET     Problem: Potential for postpartum infection related to surgical incision, compromised uterine condition, urinary tract or respiratory compromise  Goal: Patient will be afebrile and free from signs and symptoms of infection  Outcome: MET     Problem: Alteration in comfort related to surgical incision and/or after birth pains  Goal: Patient is able to ambulate, care for self and infant with acceptable pain level  Outcome: MET  Goal: Patient verbalizes acceptable pain level  Outcome: MET     Problem: Potential knowledge deficit related to lack of understanding of self and  care  Goal: Patient will verbalize understanding of self and infant care  Outcome: MET  Goal: Patient will demonstrate ability to care for self and infant  Outcome: MET     Problem: Potential anxiety related to difficulty adapting to parental role  Goal: Patient will verbalize and demonstrate effective bonding and parenting behavior  Outcome: MET     Problem: Fluid Volume:  Goal: Will maintain balanced intake and output  Outcome: MET     Problem: Pain Management  Goal: Pain level will decrease to patient's  comfort goal  Outcome: MET     Problem: Altered physiologic condition related to immediate post-delivery state and potential for bleeding/hemorrhage  Goal: Patient physiologically stable as evidenced by normal lochia, palpable uterine involution and vital signs within normal limits  Outcome: MET     Problem: Potential for postpartum infection related to presence of episiotomy/vaginal tear and/or uterine contamination  Goal: Patient will be absent from signs and symptoms of infection  Outcome: MET     Problem: Alteration in comfort related to episiotomy, vaginal repair and/or after birth pains  Goal: Patient verbalizes acceptable pain level  Outcome: MET  Goal: Patient is able to ambulate, care for self and infant  Outcome: MET

## 2020-09-26 NOTE — L&D DELIVERY NOTE
DATE OF SERVICE:  2020    The patient is a very pleasant 25-year-old (on admission  5, para 2 with 1 previous  section followed by 1 successful vaginal birth after previous  section) who is today 39 weeks and 5 days' gestation.  She presented to labor and delivery early this morning with complaints of leakage of fluid per vagina.  She has had her prenatal care with myself during the course of this pregnancy and also has been seen during the course of this pregnancy by perinatologist, Dr. Campos.  She has had a previous  section.  This was followed by 1 successful vaginal birth after previous  section.  She told me repeatedly during the course of her prenatal care during this pregnancy that she wished to have another vaginal birth after previous  section (with this pregnancy).  I discussed with her and explained to her in detail and at length the risks and benefits and alternatives of a trial of labor after previous  section in an attempt to have a vaginal birth after previous  section and discussed with her in detail and at length the subject of uterine rupture and after our discussions and after answering her questions, she told me again that she very much wished to have a trial of labor and attempt to have a vaginal birth after previous  section with this pregnancy.  She presented to labor and delivery with complaints of leakage of fluid per vagina and on presentation to labor and delivery, exam revealed obvious evidence of spontaneous rupture of membranes.  On presentation to labor and delivery, her cervix was found to be about 5 cm dilated.  The fetal heart tracing was category 1.  She received an IV and later received a labor epidural and her labor epidural functioned well.  At about 6 in the morning or a few minutes before 6 in the morning, I checked her cervix after she had received her labor epidural and her cervix was found to be  about 5-6 cm dilated and 90% effaced and the station was about -2 station and a bulging bag of water was palpated and I did at that time perform artificial rupture of membranes and clear amniotic fluid was observed and then I placed an intrauterine pressure catheter.  The fetal heart tracing continued to be category 1.  Later, the patient began having repetitive variable fetal heart rate decelerations and because of these, an amnioinfusion was ordered and subsequently these repetitive variable decelerations resolved.  She had achieved complete cervical dilation and then began pushing.  She went on to have a normal spontaneous vaginal delivery, at about 17 minutes after noontime today, and was delivered at 39 weeks and 5 days' gestation of a live term male , at 39 weeks and 5 days' gestation, with Apgar scores of 8 and 9 at one and five minutes respectively with a  weight of 3,550 grams.  Baby was delivered over an intact perineum under continuous epidural anesthesia.  The placenta was simply delivered and examined and found to be complete.  Next, examination of the vulva and vaginal mucosa revealed no lacerations.  Next, a bimanual vaginal exam was performed and revealed that there were no gauze sponges in the vagina and that the uterus was firm.  Bleeding was minimal.    ESTIMATED BLOOD LOSS:  Approximately 100 mL.       ____________________________________     Mina Marinelli MD    MED / NTS    DD:  2020 12:53:29  DT:  2020 21:34:50    D#:  [id number]  Job#:  [id number]

## 2020-09-26 NOTE — ANESTHESIA QCDR
2019 [facility] Clinical Data Registry (for Quality Improvement)     Postoperative nausea/vomiting risk protocol (Adult = 18 yrs and Pediatric 3-17 yrs)- (430 and 463)  General inhalation anesthetic (NOT TIVA) with PONV risk factors: No  Provision of anti-emetic therapy with at least 2 different classes of agents: N/A  Patient DID NOT receive anti-emetic therapy and reason is documented in Medical Record: N/A    Multimodal Pain Management- (477)  Non-emergent surgery AND patient age >= 18: No  Use of Multimodal Pain Management, two or more drugs and/or interventions, NOT including systemic opioids:   Exception: Documented allergy to multiple classes of analgesics:     Smoking Abstinence (404)  Patient is current smoker (cigarette, pipe, e-cig, marijuanna): No  Elective Surgery:   Abstinence instructions provided prior to day of surgery:   Patient abstained from smoking on day of surgery:     Pre-Op Beta-Blocker in Isolated CABG (44)  Isolated CABG AND patient age >= 18: No  Beta-blocker admin within 24 hours of surgical incision:   Exception:of medical reason(s) for not administering beta blocker within 24 hours prior to surgical incision (e.g., not  indicated,other medical reason):     PACU assessment of acute postoperative pain prior to Anesthesia Care End- Applies to Patients Age = 18- (ABG7)  Initial PACU pain score is which of the following: < 7/10  Patient unable to report pain score: N/A    Post-anesthetic transfer of care checklist/protocol to PACU/ICU- (426 and 427)  Upon conclusion of case, patient transferred to which of the following locations: PACU/Non-ICU  Use of transfer checklist/protocol: Yes  Exclusion: Service Performed in Patient Hospital Room (and thus did not require transfer): N/A  Unplanned admission to ICU related to anesthesia service up through end of PACU care- (MD51)  Unplanned admission to ICU (not initially anticipated at anesthesia start time): No

## 2020-09-26 NOTE — LACTATION NOTE
This note was copied from a baby's chart.  MOB reports that this baby is latching without difficulty and feeding well. She denies needs or questions @this time. Teach to call when infant is latched for assessment. Review outpatient lactation support.MOB voices understanding.MOB reports that this baby is latching without difficulty and feeding well. She denies needs or questions @this time. Teach to call when infant is latched for assessment. Review outpatient lactation support.MOB voices understanding.    BF POC:    Review to feed on cue a minimum of 8x/24 hours with cluster feeding as normal.     Teach to call for assessment of latch.

## 2020-09-26 NOTE — CARE PLAN
Problem: Altered physiologic condition related to postoperative  delivery  Goal: Patient physiologically stable as evidenced by normal lochia, palpable uterine involution and vital signs within normal limits  Outcome: PROGRESSING AS EXPECTED  Note: Fundus firm at U, lochia rubra minimal. V/S WNL     Problem: Altered physiologic condition related to immediate post-delivery state and potential for bleeding/hemorrhage  Goal: Patient physiologically stable as evidenced by normal lochia, palpable uterine involution and vital signs within normal limits  Outcome: PROGRESSING AS EXPECTED  Note: Fundus firm at U, lochia rubra minimal. V/S WNL     Problem: Potential for postpartum infection related to presence of episiotomy/vaginal tear and/or uterine contamination  Goal: Patient will be absent from signs and symptoms of infection  Outcome: PROGRESSING AS EXPECTED  Note: Patient has no S/S of infection noted at this time. Afebrile

## 2020-09-27 VITALS
WEIGHT: 181 LBS | HEIGHT: 64 IN | RESPIRATION RATE: 19 BRPM | TEMPERATURE: 97.2 F | BODY MASS INDEX: 30.9 KG/M2 | DIASTOLIC BLOOD PRESSURE: 76 MMHG | OXYGEN SATURATION: 96 % | HEART RATE: 57 BPM | SYSTOLIC BLOOD PRESSURE: 139 MMHG

## 2020-09-27 PROCEDURE — A9270 NON-COVERED ITEM OR SERVICE: HCPCS | Performed by: OBSTETRICS & GYNECOLOGY

## 2020-09-27 PROCEDURE — 700102 HCHG RX REV CODE 250 W/ 637 OVERRIDE(OP): Performed by: OBSTETRICS & GYNECOLOGY

## 2020-09-27 RX ADMIN — IBUPROFEN 600 MG: 600 TABLET ORAL at 02:24

## 2020-09-27 ASSESSMENT — PAIN DESCRIPTION - PAIN TYPE
TYPE: ACUTE PAIN
TYPE: ACUTE PAIN

## 2020-09-27 NOTE — CARE PLAN
Problem: Altered physiologic condition related to immediate post-delivery state and potential for bleeding/hemorrhage  Goal: Patient physiologically stable as evidenced by normal lochia, palpable uterine involution and vital signs within normal limits  Outcome: PROGRESSING AS EXPECTED  Note: Fundus firm at umbilicus with scant lochia.      Problem: Alteration in comfort related to episiotomy, vaginal repair and/or after birth pains  Goal: Patient is able to ambulate, care for self and infant  Outcome: PROGRESSING AS EXPECTED  Note: Ambulating  and voiding without difficulty.   Goal: Patient verbalizes acceptable pain level  Outcome: PROGRESSING AS EXPECTED  Note: Verbalizes acceptable pain relief with pain medication being given as requested.      Problem: Potential knowledge deficit related to lack of understanding of self and  care  Goal: Patient will demonstrate ability to care for self and infant  Outcome: PROGRESSING AS EXPECTED  Note: Breast feeding independently.

## 2020-09-27 NOTE — PROGRESS NOTES
Discharge order received. Paperwork reviewed and signed. Cuddles removed. Carseat checked. Pt escorted off floor with staff.

## 2020-09-27 NOTE — DISCHARGE SUMMARY
Discharge Summary:      Bandar Trujillo    Admit Date:   2020  Discharge Date:  2020     Admitting diagnosis:  Pregnancy  Indication for care in labor or delivery  Discharge Diagnosis: Status post vaginal, spontaneous.   Pregnancy Complications: none  Tubal Ligation:  no        History:  History reviewed. No pertinent past medical history.  OB History    Para Term  AB Living   5 3 3   2 3   SAB TAB Ectopic Molar Multiple Live Births   1 1     0 3      # Outcome Date GA Lbr Raphael/2nd Weight Sex Delivery Anes PTL Lv   5 Term 20 39w5d / 00:34 3.55 kg (7 lb 13.2 oz) M Vag-Spont EPI N JEFFERY   4 TAB 2017     TAB      3 Term 12/10/16 39w1d  3.005 kg (6 lb 10 oz) F Vag-Spont EPI N JEFFERY      Birth Comments: . pt states no complications   2 SAB 2016 6w0d    SAB         Birth Comments: Pt. states no D/C needed.    1 Term 10/22/14 39w2d  2.92 kg (6 lb 7 oz) F CS-Unspec EPI, Spinal N JEFFERY      Birth Comments: FAILURE TO PROGRESS         Tape  Patient Active Problem List    Diagnosis Date Noted   • Polyhydramnios in third trimester 2020   • Encounter for supervision of high risk pregnancy in second trimester, antepartum 2020   • History of  delivery with successful , desires TOLAC 2020        Hospital Course:   25 y.o. , now para 3, was admitted with the above mentioned diagnosis, underwent . Patient postpartum course was unremarkable, with progressive advancement in diet , ambulation and toleration of oral analgesia. Patient without complaints today and desires discharge.      Vitals:    20 2200 20 0600 20 1747 20 0600   BP: 104/61 109/69 110/69 139/76   Pulse: 86 68 79 (!) 57   Resp: 18 16 16 19   Temp: 36.3 °C (97.4 °F) 36.7 °C (98 °F) 35.9 °C (96.6 °F) 36.2 °C (97.2 °F)   TempSrc: Temporal Temporal Oral Temporal   SpO2: 96% 98%  96%   Weight:       Height:           Current Facility-Administered Medications   Medication  Dose   • ondansetron (ZOFRAN ODT) dispertab 4 mg  4 mg    Or   • ondansetron (ZOFRAN) syringe/vial injection 4 mg  4 mg   • oxytocin (PITOCIN) infusion (for postpartum)   mL/hr   • ibuprofen (MOTRIN) tablet 600 mg  600 mg   • oxyCODONE immediate-release (ROXICODONE) tablet 5 mg  5 mg   • LR infusion     • tetanus-dipth-acell pertussis (Tdap) inj 0.5 mL  0.5 mL   • measles, mumps and rubella vaccine (MMR) injection 0.5 mL  0.5 mL   • PRN oxytocin (PITOCIN) (20 Units/1000 mL) PRN for excessive uterine bleeding - See Admin Instr  125-999 mL/hr   • methylergonovine (METHERGINE) injection 0.2 mg  0.2 mg   • carboPROST (HEMABATE) injection 250 mcg  250 mcg   • docusate sodium (COLACE) capsule 100 mg  100 mg       Exam:  Breast Exam: negative  Abdomen: Abdomen soft, non-tender. BS normal. No masses,  No organomegaly  Fundus Non Tender: yes  Perineum: perineum intact  Extremity: extremities, peripheral pulses and reflexes normal     Labs:  Recent Labs     09/25/20  0310 09/25/20  2152   WBC 9.3 11.3*   RBC 4.13* 3.89*   HEMOGLOBIN 12.5 11.7*   HEMATOCRIT 36.2* 34.6*   MCV 87.7 88.9   MCH 30.3 30.1   MCHC 34.5 33.8   RDW 45.6 47.0   PLATELETCT 180 169   MPV 11.3 10.9        Activity:   Discharge to home  Pelvic Rest x 6 weeks    Assessment:  normal postpartum course  Discharge Assessment: Taking adequate diet and fluids     Follow up: Dr Marinelli 6 weeks PP     Discharge Meds:   No current outpatient medications on file.       Chel Austin M.D.

## 2022-01-08 ENCOUNTER — HOSPITAL ENCOUNTER (OUTPATIENT)
Dept: LAB | Facility: MEDICAL CENTER | Age: 27
End: 2022-01-08
Attending: SPECIALIST
Payer: MEDICAID

## 2022-01-08 LAB
ABO GROUP BLD: NORMAL
BLD GP AB SCN SERPL QL: NORMAL
GLUCOSE 1H P 50 G GLC PO SERPL-MCNC: 112 MG/DL (ref 70–139)
HBV SURFACE AG SER QL: NORMAL
HCT VFR BLD AUTO: 38.6 % (ref 37–47)
HCV AB SER QL: NORMAL
HGB BLD-MCNC: 12.7 G/DL (ref 12–16)
HIV 1+2 AB+HIV1 P24 AG SERPL QL IA: NORMAL
RH BLD: NORMAL
RUBV AB SER QL: 17.1 IU/ML
TREPONEMA PALLIDUM IGG+IGM AB [PRESENCE] IN SERUM OR PLASMA BY IMMUNOASSAY: NORMAL

## 2022-01-08 PROCEDURE — 86762 RUBELLA ANTIBODY: CPT

## 2022-01-08 PROCEDURE — 86780 TREPONEMA PALLIDUM: CPT

## 2022-01-08 PROCEDURE — 36415 COLL VENOUS BLD VENIPUNCTURE: CPT

## 2022-01-08 PROCEDURE — 82950 GLUCOSE TEST: CPT

## 2022-01-08 PROCEDURE — 86900 BLOOD TYPING SEROLOGIC ABO: CPT

## 2022-01-08 PROCEDURE — 85014 HEMATOCRIT: CPT

## 2022-01-08 PROCEDURE — 87389 HIV-1 AG W/HIV-1&-2 AB AG IA: CPT

## 2022-01-08 PROCEDURE — 86901 BLOOD TYPING SEROLOGIC RH(D): CPT

## 2022-01-08 PROCEDURE — 86803 HEPATITIS C AB TEST: CPT

## 2022-01-08 PROCEDURE — 85018 HEMOGLOBIN: CPT

## 2022-01-08 PROCEDURE — 87340 HEPATITIS B SURFACE AG IA: CPT

## 2022-01-08 PROCEDURE — 86850 RBC ANTIBODY SCREEN: CPT

## 2022-03-23 ENCOUNTER — HOSPITAL ENCOUNTER (INPATIENT)
Facility: MEDICAL CENTER | Age: 27
LOS: 1 days | End: 2022-03-24
Attending: SPECIALIST | Admitting: SPECIALIST
Payer: MEDICAID

## 2022-03-23 DIAGNOSIS — R10.2 PELVIC PAIN: ICD-10-CM

## 2022-03-23 LAB
BASOPHILS # BLD AUTO: 0.5 % (ref 0–1.8)
BASOPHILS # BLD: 0.04 K/UL (ref 0–0.12)
EOSINOPHIL # BLD AUTO: 0.09 K/UL (ref 0–0.51)
EOSINOPHIL NFR BLD: 1 % (ref 0–6.9)
ERYTHROCYTE [DISTWIDTH] IN BLOOD BY AUTOMATED COUNT: 45.9 FL (ref 35.9–50)
HCT VFR BLD AUTO: 36.7 % (ref 37–47)
HGB BLD-MCNC: 12.3 G/DL (ref 12–16)
HOLDING TUBE BB 8507: NORMAL
IMM GRANULOCYTES # BLD AUTO: 0.08 K/UL (ref 0–0.11)
IMM GRANULOCYTES NFR BLD AUTO: 0.9 % (ref 0–0.9)
LYMPHOCYTES # BLD AUTO: 1.99 K/UL (ref 1–4.8)
LYMPHOCYTES NFR BLD: 23.1 % (ref 22–41)
MCH RBC QN AUTO: 29.9 PG (ref 27–33)
MCHC RBC AUTO-ENTMCNC: 33.5 G/DL (ref 33.6–35)
MCV RBC AUTO: 89.3 FL (ref 81.4–97.8)
MONOCYTES # BLD AUTO: 0.83 K/UL (ref 0–0.85)
MONOCYTES NFR BLD AUTO: 9.7 % (ref 0–13.4)
NEUTROPHILS # BLD AUTO: 5.57 K/UL (ref 2–7.15)
NEUTROPHILS NFR BLD: 64.8 % (ref 44–72)
NRBC # BLD AUTO: 0 K/UL
NRBC BLD-RTO: 0 /100 WBC
PLATELET # BLD AUTO: 203 K/UL (ref 164–446)
PMV BLD AUTO: 11.3 FL (ref 9–12.9)
RBC # BLD AUTO: 4.11 M/UL (ref 4.2–5.4)
SARS-COV+SARS-COV-2 AG RESP QL IA.RAPID: NOTDETECTED
SPECIMEN SOURCE: NORMAL
WBC # BLD AUTO: 8.6 K/UL (ref 4.8–10.8)

## 2022-03-23 PROCEDURE — 85025 COMPLETE CBC W/AUTO DIFF WBC: CPT

## 2022-03-23 PROCEDURE — 700111 HCHG RX REV CODE 636 W/ 250 OVERRIDE (IP)

## 2022-03-23 PROCEDURE — 302449 STATCHG TRIAGE ONLY (STATISTIC)

## 2022-03-23 PROCEDURE — 87426 SARSCOV CORONAVIRUS AG IA: CPT

## 2022-03-23 PROCEDURE — 700105 HCHG RX REV CODE 258: Performed by: OBSTETRICS & GYNECOLOGY

## 2022-03-23 PROCEDURE — 96374 THER/PROPH/DIAG INJ IV PUSH: CPT

## 2022-03-23 PROCEDURE — 59025 FETAL NON-STRESS TEST: CPT

## 2022-03-23 PROCEDURE — 770002 HCHG ROOM/CARE - OB PRIVATE (112)

## 2022-03-23 PROCEDURE — 700111 HCHG RX REV CODE 636 W/ 250 OVERRIDE (IP): Performed by: OBSTETRICS & GYNECOLOGY

## 2022-03-23 PROCEDURE — 96376 TX/PRO/DX INJ SAME DRUG ADON: CPT

## 2022-03-23 PROCEDURE — A9270 NON-COVERED ITEM OR SERVICE: HCPCS | Performed by: SPECIALIST

## 2022-03-23 PROCEDURE — 304965 HCHG RECOVERY SERVICES

## 2022-03-23 PROCEDURE — 700111 HCHG RX REV CODE 636 W/ 250 OVERRIDE (IP): Performed by: SPECIALIST

## 2022-03-23 PROCEDURE — 59409 OBSTETRICAL CARE: CPT

## 2022-03-23 PROCEDURE — 3E0234Z INTRODUCTION OF SERUM, TOXOID AND VACCINE INTO MUSCLE, PERCUTANEOUS APPROACH: ICD-10-PCS | Performed by: SPECIALIST

## 2022-03-23 PROCEDURE — 700102 HCHG RX REV CODE 250 W/ 637 OVERRIDE(OP): Performed by: SPECIALIST

## 2022-03-23 PROCEDURE — 36415 COLL VENOUS BLD VENIPUNCTURE: CPT

## 2022-03-23 RX ORDER — TERBUTALINE SULFATE 1 MG/ML
0.25 INJECTION, SOLUTION SUBCUTANEOUS
Status: DISCONTINUED | OUTPATIENT
Start: 2022-03-23 | End: 2022-03-23 | Stop reason: HOSPADM

## 2022-03-23 RX ORDER — IBUPROFEN 800 MG/1
800 TABLET ORAL
Status: DISCONTINUED | OUTPATIENT
Start: 2022-03-23 | End: 2022-03-23

## 2022-03-23 RX ORDER — SODIUM CHLORIDE, SODIUM LACTATE, POTASSIUM CHLORIDE, AND CALCIUM CHLORIDE .6; .31; .03; .02 G/100ML; G/100ML; G/100ML; G/100ML
1000 INJECTION, SOLUTION INTRAVENOUS ONCE
Status: COMPLETED | OUTPATIENT
Start: 2022-03-23 | End: 2022-03-23

## 2022-03-23 RX ORDER — SODIUM CHLORIDE, SODIUM LACTATE, POTASSIUM CHLORIDE, CALCIUM CHLORIDE 600; 310; 30; 20 MG/100ML; MG/100ML; MG/100ML; MG/100ML
INJECTION, SOLUTION INTRAVENOUS PRN
Status: DISCONTINUED | OUTPATIENT
Start: 2022-03-23 | End: 2022-03-24 | Stop reason: HOSPADM

## 2022-03-23 RX ORDER — DOCUSATE SODIUM 100 MG/1
100 CAPSULE, LIQUID FILLED ORAL 2 TIMES DAILY PRN
Status: DISCONTINUED | OUTPATIENT
Start: 2022-03-23 | End: 2022-03-24 | Stop reason: HOSPADM

## 2022-03-23 RX ORDER — SODIUM CHLORIDE, SODIUM LACTATE, POTASSIUM CHLORIDE, CALCIUM CHLORIDE 600; 310; 30; 20 MG/100ML; MG/100ML; MG/100ML; MG/100ML
INJECTION, SOLUTION INTRAVENOUS CONTINUOUS
Status: DISCONTINUED | OUTPATIENT
Start: 2022-03-23 | End: 2022-03-24 | Stop reason: HOSPADM

## 2022-03-23 RX ORDER — IBUPROFEN 800 MG/1
800 TABLET ORAL EVERY 8 HOURS PRN
Status: DISCONTINUED | OUTPATIENT
Start: 2022-03-23 | End: 2022-03-24 | Stop reason: HOSPADM

## 2022-03-23 RX ORDER — ACETAMINOPHEN 500 MG
1000 TABLET ORAL
Status: DISCONTINUED | OUTPATIENT
Start: 2022-03-23 | End: 2022-03-23 | Stop reason: HOSPADM

## 2022-03-23 RX ORDER — ACETAMINOPHEN 500 MG
1000 TABLET ORAL EVERY 6 HOURS PRN
Status: DISCONTINUED | OUTPATIENT
Start: 2022-03-23 | End: 2022-03-24 | Stop reason: HOSPADM

## 2022-03-23 RX ORDER — MISOPROSTOL 200 UG/1
600 TABLET ORAL
Status: DISCONTINUED | OUTPATIENT
Start: 2022-03-23 | End: 2022-03-24 | Stop reason: HOSPADM

## 2022-03-23 RX ORDER — OXYTOCIN 10 [USP'U]/ML
10 INJECTION, SOLUTION INTRAMUSCULAR; INTRAVENOUS
Status: DISCONTINUED | OUTPATIENT
Start: 2022-03-23 | End: 2022-03-23 | Stop reason: HOSPADM

## 2022-03-23 RX ADMIN — SODIUM CHLORIDE, POTASSIUM CHLORIDE, SODIUM LACTATE AND CALCIUM CHLORIDE 1000 ML: 600; 310; 30; 20 INJECTION, SOLUTION INTRAVENOUS at 04:46

## 2022-03-23 RX ADMIN — OXYTOCIN 2000 ML/HR: 10 INJECTION, SOLUTION INTRAMUSCULAR; INTRAVENOUS at 11:32

## 2022-03-23 RX ADMIN — FENTANYL CITRATE 100 MCG: 50 INJECTION, SOLUTION INTRAMUSCULAR; INTRAVENOUS at 08:37

## 2022-03-23 RX ADMIN — IBUPROFEN 800 MG: 800 TABLET, FILM COATED ORAL at 20:00

## 2022-03-23 RX ADMIN — FENTANYL CITRATE 100 MCG: 50 INJECTION, SOLUTION INTRAMUSCULAR; INTRAVENOUS at 07:18

## 2022-03-23 RX ADMIN — OXYTOCIN 125 ML/HR: 10 INJECTION, SOLUTION INTRAMUSCULAR; INTRAVENOUS at 12:34

## 2022-03-23 RX ADMIN — DOCUSATE SODIUM 100 MG: 100 CAPSULE, LIQUID FILLED ORAL at 22:09

## 2022-03-23 RX ADMIN — FENTANYL CITRATE 100 MCG: 50 INJECTION, SOLUTION INTRAMUSCULAR; INTRAVENOUS at 09:38

## 2022-03-23 RX ADMIN — SODIUM CHLORIDE, POTASSIUM CHLORIDE, SODIUM LACTATE AND CALCIUM CHLORIDE 1000 ML: 600; 310; 30; 20 INJECTION, SOLUTION INTRAVENOUS at 07:20

## 2022-03-23 RX ADMIN — FENTANYL CITRATE 100 MCG: 50 INJECTION, SOLUTION INTRAMUSCULAR; INTRAVENOUS at 10:37

## 2022-03-23 ASSESSMENT — LIFESTYLE VARIABLES
EVER_SMOKED: NEVER
ALCOHOL_USE: NO

## 2022-03-23 ASSESSMENT — PAIN DESCRIPTION - PAIN TYPE
TYPE: ACUTE PAIN

## 2022-03-23 NOTE — PROGRESS NOTES
0328: Pt presents in triage with c/o contractions. Pt states no VB, no LOF, and presence of fetal movement. Pt put on toco/ FHR external monitors.     0348: SVE by this RN 3-4/70/-2.     0530: SVE by this RN. Called MD Rula Milligan with report, order for cervical exam recheck in one hour.     0645: SVE by this RN     0705: Report given to Cassia KELLER.

## 2022-03-23 NOTE — PROGRESS NOTES
0825: Assumed care of pt. AAO, pt using B/R techniques with medication for pain. POC discussed, pt and CAM Pagan verbalized understanding.  1127: , viable male per Dr. Marinelli, infant assigned 8/9 APGARS, infant skin to skin with pt  1400: Pt ambulated, voided, sammy-care complete, pt tolerated well. Pt and infant transferred to postpartum via wheelchair. Report given to Deidre KELLER. Pt in stable condition with a firm fundus and light lochia. ID bands verified.

## 2022-03-23 NOTE — L&D DELIVERY NOTE
DATE OF SERVICE:  2022     The patient is a very pleasant 27-year-old multipara (on admission para 3 with 1  section, followed by two successful ) who is today 38 weeks and 5 days gestation.  She has had her prenatal care with myself during the course of this pregnancy.  Her pregnancy has been complicated by the fact that she has had a previous  section.  This previous  section was followed by two successful .  She has also been seen during the course of this pregnancy by perinatology, [facility] [place].  I saw her in the office yesterday afternoon at 38 weeks and 4 days gestation and when I saw her in the office yesterday afternoon her cervix was found to be 3 cm dilated and 70% effaced and -2 station and the fetus was found to be in cephalic presentation.  Her recent vaginal culture for group B strep came back negative for group B strep.     She presented to [facility] Labor and Delivery this morning with complaints of frequent and painful uterine contractions.  She says that ever since her visit with me yesterday she began having uterine contractions and says that since I saw her in the office yesterday these contractions have slowly became more and more frequent and intense.  She presented to [facility] Labor and Delivery triage area at about 3:30 this morning, Wednesday, 3/23/2022 and presented with complaints of contractions and denied any vaginal bleeding and denied any leakage of fluid per vagina.  At about 3:48 this morning, her cervix was checked and found to be 3-4 cm dilated and 70% effaced and -2 station.  Her cervix continued to change.  The fetal heart tracing continued to be category 1.  She experienced spontaneous rupture of membranes this morning and at that time, her cervix was found to be about 9 cm dilated.  Spontaneous rupture of membranes occurred at about 10:30 this morning.     She achieved complete  cervical dilation and began pushing and did not have to push for very long until delivering.  It was at 11:27 a.m. today, Wednesday, 3/23/2022 that she went on to have a normal spontaneous vaginal delivery (vaginal birth after previous  section), at 38 weeks and 5 days' gestation.  She delivered of a live term male  with Apgars scores of 8 and 9 at 1 and 5 minutes respectively and a  weight of 3,665 grams.  Baby was delivered over an intact perineum under no anesthesia.  The placenta was simply delivered and examined and found to be complete.  Examination of the vulva and vaginal mucosa revealed no lacerations and in fact no abrasions.  The uterus appeared to be firm.  Bleeding was then found to be minimal.  The estimated blood loss was less than 100 mL.  Lap count was found to be correct.        ______________________________  Mina Marinelli MD    MED/ASS    DD:  2022 13:14  DT:  2022 15:32    Job#:  [id number]

## 2022-03-23 NOTE — L&D DELIVERY NOTE
Vaginal birth after previous  section.  live term male .  Apgar scores 8 and 9 at one and five minutes respectively.   weight not yet measured or not yet made available to me but estimated to be approximately 3.5 kilograms.  Baby was delivered over and intact perineum under no anesthesia.  The placenta was than simply delivered and examined and found to be complete.  Next examination of the vulva and vagina revealed no lacerations and in fact no abrasions.  The uterus than appear to be firm.  Bleeding was minimal.  The estimate above loss was less than 100 cc.  Lap count was found be correct.  Mina Marinelli M.D.

## 2022-03-23 NOTE — PROGRESS NOTES
0700 - Report received from Priti KELLER. Patient of Dr. Marinelli.    Weinstein Gestation today at 38.5 weeks    Working through contractions with strained effort. Denies ROM or Bleeding - pink spotting with mucous this am. Denies change to vision/edema/HA, Reports FM. FM/TOCO use discussed and placed, POC discussed. DAVIDB Jermain Pagan at the BS.    Update to Dr. Oconnor  Plan IV fentanyl and IV hydration with SVE in one hour.     Dry erase board updated with RN contact information; reviewed.   Patient encouraged to call RN with all questions concerns needs prn.  Water/ice available/encouraged at the BS    0745 - in/out of sleep. Reports discomfort low in the back and low abdomen continues but improved since IV fentanyl.     0825 - Discomfort returning. Requesting repeat fentanyl. SVE 5-6/100/0 no blood or fluid noted on the exam glove.   BBOW noted.     Report to Dr. Marinelli regarding patient arrival/complaint/status. Order for admission. Update to L&D charge RN. POC discussed with patient and FOB.     Transfer to L&D room 224 with FOB and RN at Side, report to Xiao KELLER.

## 2022-03-24 ENCOUNTER — PHARMACY VISIT (OUTPATIENT)
Dept: PHARMACY | Facility: MEDICAL CENTER | Age: 27
End: 2022-03-24
Payer: COMMERCIAL

## 2022-03-24 VITALS
SYSTOLIC BLOOD PRESSURE: 101 MMHG | DIASTOLIC BLOOD PRESSURE: 68 MMHG | BODY MASS INDEX: 28.66 KG/M2 | TEMPERATURE: 97.5 F | RESPIRATION RATE: 18 BRPM | HEART RATE: 75 BPM | HEIGHT: 65 IN | OXYGEN SATURATION: 96 % | WEIGHT: 172 LBS

## 2022-03-24 LAB
BASOPHILS # BLD AUTO: 0.4 % (ref 0–1.8)
BASOPHILS # BLD: 0.04 K/UL (ref 0–0.12)
EOSINOPHIL # BLD AUTO: 0.15 K/UL (ref 0–0.51)
EOSINOPHIL NFR BLD: 1.6 % (ref 0–6.9)
ERYTHROCYTE [DISTWIDTH] IN BLOOD BY AUTOMATED COUNT: 47 FL (ref 35.9–50)
HCT VFR BLD AUTO: 32.6 % (ref 37–47)
HGB BLD-MCNC: 10.7 G/DL (ref 12–16)
IMM GRANULOCYTES # BLD AUTO: 0.08 K/UL (ref 0–0.11)
IMM GRANULOCYTES NFR BLD AUTO: 0.9 % (ref 0–0.9)
LYMPHOCYTES # BLD AUTO: 2.31 K/UL (ref 1–4.8)
LYMPHOCYTES NFR BLD: 25 % (ref 22–41)
MCH RBC QN AUTO: 29.6 PG (ref 27–33)
MCHC RBC AUTO-ENTMCNC: 32.8 G/DL (ref 33.6–35)
MCV RBC AUTO: 90.3 FL (ref 81.4–97.8)
MONOCYTES # BLD AUTO: 0.76 K/UL (ref 0–0.85)
MONOCYTES NFR BLD AUTO: 8.2 % (ref 0–13.4)
NEUTROPHILS # BLD AUTO: 5.91 K/UL (ref 2–7.15)
NEUTROPHILS NFR BLD: 63.9 % (ref 44–72)
NRBC # BLD AUTO: 0 K/UL
NRBC BLD-RTO: 0 /100 WBC
PLATELET # BLD AUTO: 182 K/UL (ref 164–446)
PMV BLD AUTO: 11.3 FL (ref 9–12.9)
RBC # BLD AUTO: 3.61 M/UL (ref 4.2–5.4)
WBC # BLD AUTO: 9.3 K/UL (ref 4.8–10.8)

## 2022-03-24 PROCEDURE — 700102 HCHG RX REV CODE 250 W/ 637 OVERRIDE(OP): Performed by: SPECIALIST

## 2022-03-24 PROCEDURE — 85025 COMPLETE CBC W/AUTO DIFF WBC: CPT

## 2022-03-24 PROCEDURE — A9270 NON-COVERED ITEM OR SERVICE: HCPCS | Performed by: SPECIALIST

## 2022-03-24 PROCEDURE — RXMED WILLOW AMBULATORY MEDICATION CHARGE: Performed by: SPECIALIST

## 2022-03-24 PROCEDURE — 36415 COLL VENOUS BLD VENIPUNCTURE: CPT

## 2022-03-24 RX ORDER — OXYCODONE HYDROCHLORIDE AND ACETAMINOPHEN 5; 325 MG/1; MG/1
1 TABLET ORAL EVERY 6 HOURS PRN
Qty: 28 TABLET | Refills: 0 | Status: SHIPPED | OUTPATIENT
Start: 2022-03-24 | End: 2022-03-31

## 2022-03-24 RX ORDER — IBUPROFEN 800 MG/1
800 TABLET ORAL EVERY 8 HOURS PRN
Qty: 30 TABLET | Refills: 0 | Status: SHIPPED | OUTPATIENT
Start: 2022-03-24

## 2022-03-24 RX ADMIN — IBUPROFEN 800 MG: 800 TABLET, FILM COATED ORAL at 08:10

## 2022-03-24 ASSESSMENT — EDINBURGH POSTNATAL DEPRESSION SCALE (EPDS)
I HAVE BEEN ANXIOUS OR WORRIED FOR NO GOOD REASON: NO, NOT AT ALL
I HAVE BEEN SO UNHAPPY THAT I HAVE HAD DIFFICULTY SLEEPING: NOT AT ALL
I HAVE FELT SAD OR MISERABLE: NO, NOT AT ALL
I HAVE BLAMED MYSELF UNNECESSARILY WHEN THINGS WENT WRONG: NOT VERY OFTEN
I HAVE BEEN ABLE TO LAUGH AND SEE THE FUNNY SIDE OF THINGS: AS MUCH AS I ALWAYS COULD
THE THOUGHT OF HARMING MYSELF HAS OCCURRED TO ME: NEVER
I HAVE LOOKED FORWARD WITH ENJOYMENT TO THINGS: AS MUCH AS I EVER DID
I HAVE BEEN SO UNHAPPY THAT I HAVE BEEN CRYING: NO, NEVER
I HAVE FELT SCARED OR PANICKY FOR NO GOOD REASON: NO, NOT AT ALL
THINGS HAVE BEEN GETTING ON TOP OF ME: NO, I HAVE BEEN COPING AS WELL AS EVER

## 2022-03-24 NOTE — CARE PLAN
Problem: Pain - Standard  Goal: Alleviation of pain or a reduction in pain to the patient’s comfort goal  Outcome: Progressing  Tylenol given at 2000, with good result, will continue to monitor   The patient is Stable - Low risk of patient condition declining or worsening    Shift Goals  Clinical Goals: adequate pain control, void    Progress made toward(s) clinical / shift goals:     Patient is not progressing towards the following goals:

## 2022-03-24 NOTE — LACTATION NOTE
This note was copied from a baby's chart.  27yr, , 38wk6d    Experienced breastfeeding mom currently breastfeeding 18 month old baby at home.  MOB states that she has breast fed all 3 other children and denies any breastfeeding questions or concerns.  Currently enrolled in [facility] and is aware to call for lactation or breastfeeding support once home.    MOB denies nipple or breast pain with breastfeeding.  Education provided and reminded mom to allow baby to be skin to skin as much as possible while MOB is awake and to allow baby to breastfeed frequently, with cues, at at least 8-10 times every 24 hours.    [facility] Breastfeeding Resources sheet provided and recommended websites and [place] Breastfeeding videos    Instructed to call LC or RN if any needs or breastfeed questions or concerns prior to discharging home today.

## 2022-03-24 NOTE — PROGRESS NOTES
POST PARTUM DAY # 1  The patient complains of cramping pain.   She says that she feels fine otherwise and she says that she has no other problems or complaints.   She says that she would like to go home today.   The patient's vital signs are stable and she is afebrile.   She appears well developed and well nourished and relaxed and alert and comfortable and in no apparent distress.   Labs: the patient's hemoglobin went from 12.3 grams per deciliter antepartum to 10.7 grams per deciliter post partum.   We will plan on discharge home later today.   Rx's for percocet and ibuprofen.   Follow up in office in about six weeks for a post partum visit.   Mina Marinelli M.D.

## 2022-03-24 NOTE — DISCHARGE PLANNING
Meds-to-Beds: Discharge prescription orders listed below delivered to patient's bedside ARIANNA Jiang as patient sleeping. Written information regarding the dispensed prescriptions was provided to the patient including the phone number of the pharmacy to call for any questions.    Current Outpatient Medications   Medication Sig Dispense Refill   • oxyCODONE-acetaminophen (PERCOCET) 5-325 MG Tab Take 1 Tablet by mouth every 6 hours as needed for Moderate Pain or Severe Pain for up to 7 days. 28 Tablet 0   • ibuprofen (MOTRIN) 800 MG Tab Take 1 Tablet by mouth every 8 hours as needed for Mild Pain or Moderate Pain. 30 Tablet 0      Janet Bosch, PharmD

## 2022-03-24 NOTE — PROGRESS NOTES
Pt received to room 332. Report received from L&D RN. Pt oriented to room, call light, infant security, emergency light, visiting hours and unit routine. Plan of care discussed encouraged to call with needs.

## 2022-03-24 NOTE — PROGRESS NOTES
Discharge instructions reviewed with patient and S.O.  Discharge meds delivered to room.  Discharged to home ambulatory with spouse, children, and infant

## 2022-04-19 NOTE — H&P
DATE OF ADMISSION:  2022     IDENTIFICATION:  The patient is a very pleasant 27-year-old (on admission    6, para 3 with 1  section, followed by two successful vaginal   births after previous  section) who is 38 weeks and 5 days gestation.     CHIEF COMPLAINT:  Frequent painful uterine contractions.     HISTORY OF PRESENT ILLNESS:  The patient is a very pleasant 27-year-old   multipara (on admission, para 3 with 1  section, followed by two   successful VBACs) who is on admission 38 weeks and 5 days gestation.  Her   pregnancy has been complicated by the fact that she had a previous    section.  This previous  section and was followed by two successful   VBACs.  She had also been seen during the course of her pregnancy by   perinatology, namely [facility] [place].  I saw her in the office on   the day prior to admission at 38 weeks and 4 days gestation and when I had   seen her in the office in the afternoon, I checked her cervix at that time and   at that time found her cervix to be about 3 cm dilated and 70% effaced and -2   station and the fetus was found to be in cephalic presentation and it was   noted that her recent vaginal culture for group B strep had come back negative   for group B strep.  She presented to [facility] Labor and   Delivery on the day of admission, in the morning, with complaints of frequent   and painful uterine contractions and she said that ever since her visit with   me, she had been experiencing uterine contractions and she said that since I   had seen her in the office yesterday, these contractions slowly became more   and more frequent and intense.  She presented to [facility] Labor and Delivery at about 3:30 in the morning on 2022   and presented with complaints of contractions and denied any vaginal bleeding   and denied any leakage of fluid per vagina.  At about  3:48 in the morning,   her cervix was checked and found to be about 3-4 cm dilated and 70% effaced   and -2 station and her cervix continued to change.  The fetal heart tracing   continued to be category 1.  She experienced a spontaneous rupture of   membranes in the morning and at that time, her cervix was found to be about 9   cm dilated and spontaneous rupture of membranes occurred at about 10:30 in the   morning.  She then achieved complete cervical dilation and began pushing and   did not have to push for very long before delivering (vaginal birth after   previous  section).  Please see dictated delivery note for details.     PAST MEDICAL HISTORY:  No medical illnesses.     PAST SURGICAL HISTORY:  Previous  section.     MEDICATIONS:  None other than for vitamins.     ALLERGIES:  No known drug allergies.     SOCIAL HISTORY:  The patient denies smoking.  She denies using recreational   drugs.  She denies consuming alcoholic beverages.     REVIEW OF SYSTEMS:    GENERAL:  No fevers or chills or sweats.  PULMONARY:  No coughing or wheezing or chest pain or shortness of breath.  CARDIOVASCULAR:  No palpitations or dyspnea or chest pain.  GASTROINTESTINAL:  No nausea, vomiting, diarrhea or constipation.  GENITOURINARY:  The patient presented with complaints of frequent and painful   uterine contractions.  MUSCULOSKELETAL:  No arthralgias, myalgias other than for hip pain and low   back pain.  NEUROLOGICAL:  No headaches or syncope or seizures.     PHYSICAL EXAMINATION:    VITAL SIGNS:  The patient's vital signs are stable and she is afebrile.  GENERAL:  Well-developed, well-nourished and in no apparent distress except   for during contractions, she is uncomfortable.  HEENT:  Normocephalic, atraumatic.  CHEST:  Heart regular rate and rhythm, no murmur.  LUNGS:  Clear to auscultation bilaterally.  ABDOMEN:  Gravid, about 9-month size.  PELVIC:  Cervix, initially the cervix was 3-4 cm dilated and  unchanged.  EXTREMITIES:  No clubbing, cyanosis or edema except for mild bilateral   symmetrical lower extremity edema consistent with term pregnancy.  NEUROLOGIC:  Nonfocal.     ASSESSMENT:    1.  Intrauterine pregnancy at 38 weeks and 5 days gestation.  2.  Labor.  3.  Previous  section.     PLAN:  The patient went on to have a vaginal birth after previous    section.  We will continue with a normal postpartum care.  Please see dictated   delivery note for details regarding delivery.        ______________________________  Mina Marinelli MD    MED/NOAH    DD:  2022 09:17  DT:  2022 09:41    Job#:  [id number]

## 2022-04-27 ENCOUNTER — HOSPITAL ENCOUNTER (EMERGENCY)
Facility: MEDICAL CENTER | Age: 27
End: 2022-04-28
Attending: EMERGENCY MEDICINE
Payer: MEDICAID

## 2022-04-27 DIAGNOSIS — R45.1 AGITATION: ICD-10-CM

## 2022-04-27 LAB
ALBUMIN SERPL BCP-MCNC: 4.5 G/DL (ref 3.2–4.9)
ALBUMIN/GLOB SERPL: 1.9 G/DL
ALP SERPL-CCNC: 90 U/L (ref 30–99)
ALT SERPL-CCNC: 19 U/L (ref 2–50)
ANION GAP SERPL CALC-SCNC: 15 MMOL/L (ref 7–16)
AST SERPL-CCNC: 20 U/L (ref 12–45)
BASOPHILS # BLD AUTO: 1 % (ref 0–1.8)
BASOPHILS # BLD: 0.05 K/UL (ref 0–0.12)
BILIRUB SERPL-MCNC: 0.2 MG/DL (ref 0.1–1.5)
BUN SERPL-MCNC: 9 MG/DL (ref 8–22)
CALCIUM SERPL-MCNC: 8.1 MG/DL (ref 8.5–10.5)
CHLORIDE SERPL-SCNC: 110 MMOL/L (ref 96–112)
CO2 SERPL-SCNC: 20 MMOL/L (ref 20–33)
CREAT SERPL-MCNC: 0.81 MG/DL (ref 0.5–1.4)
EOSINOPHIL # BLD AUTO: 0.08 K/UL (ref 0–0.51)
EOSINOPHIL NFR BLD: 1.6 % (ref 0–6.9)
ERYTHROCYTE [DISTWIDTH] IN BLOOD BY AUTOMATED COUNT: 42.6 FL (ref 35.9–50)
ETHANOL BLD-MCNC: 396 MG/DL (ref 0–10)
GFR SERPLBLD CREATININE-BSD FMLA CKD-EPI: 101 ML/MIN/1.73 M 2
GLOBULIN SER CALC-MCNC: 2.4 G/DL (ref 1.9–3.5)
GLUCOSE SERPL-MCNC: 105 MG/DL (ref 65–99)
HCG SERPL QL: NEGATIVE
HCT VFR BLD AUTO: 40.2 % (ref 37–47)
HGB BLD-MCNC: 13.1 G/DL (ref 12–16)
IMM GRANULOCYTES # BLD AUTO: 0.02 K/UL (ref 0–0.11)
IMM GRANULOCYTES NFR BLD AUTO: 0.4 % (ref 0–0.9)
LYMPHOCYTES # BLD AUTO: 2.52 K/UL (ref 1–4.8)
LYMPHOCYTES NFR BLD: 51.1 % (ref 22–41)
MCH RBC QN AUTO: 28.7 PG (ref 27–33)
MCHC RBC AUTO-ENTMCNC: 32.6 G/DL (ref 33.6–35)
MCV RBC AUTO: 88.2 FL (ref 81.4–97.8)
MONOCYTES # BLD AUTO: 0.37 K/UL (ref 0–0.85)
MONOCYTES NFR BLD AUTO: 7.5 % (ref 0–13.4)
NEUTROPHILS # BLD AUTO: 1.89 K/UL (ref 2–7.15)
NEUTROPHILS NFR BLD: 38.4 % (ref 44–72)
NRBC # BLD AUTO: 0 K/UL
NRBC BLD-RTO: 0 /100 WBC
PLATELET # BLD AUTO: 250 K/UL (ref 164–446)
PMV BLD AUTO: 9.4 FL (ref 9–12.9)
POTASSIUM SERPL-SCNC: 3.1 MMOL/L (ref 3.6–5.5)
PROT SERPL-MCNC: 6.9 G/DL (ref 6–8.2)
RBC # BLD AUTO: 4.56 M/UL (ref 4.2–5.4)
SODIUM SERPL-SCNC: 145 MMOL/L (ref 135–145)
WBC # BLD AUTO: 4.9 K/UL (ref 4.8–10.8)

## 2022-04-27 PROCEDURE — 84703 CHORIONIC GONADOTROPIN ASSAY: CPT

## 2022-04-27 PROCEDURE — 85025 COMPLETE CBC W/AUTO DIFF WBC: CPT

## 2022-04-27 PROCEDURE — 80053 COMPREHEN METABOLIC PANEL: CPT

## 2022-04-27 PROCEDURE — 96360 HYDRATION IV INFUSION INIT: CPT

## 2022-04-27 PROCEDURE — 99285 EMERGENCY DEPT VISIT HI MDM: CPT

## 2022-04-27 PROCEDURE — 82077 ASSAY SPEC XCP UR&BREATH IA: CPT

## 2022-04-27 PROCEDURE — 96361 HYDRATE IV INFUSION ADD-ON: CPT

## 2022-04-27 PROCEDURE — 700105 HCHG RX REV CODE 258: Performed by: EMERGENCY MEDICINE

## 2022-04-27 RX ORDER — SODIUM CHLORIDE, SODIUM LACTATE, POTASSIUM CHLORIDE, CALCIUM CHLORIDE 600; 310; 30; 20 MG/100ML; MG/100ML; MG/100ML; MG/100ML
1000 INJECTION, SOLUTION INTRAVENOUS ONCE
Status: COMPLETED | OUTPATIENT
Start: 2022-04-27 | End: 2022-04-28

## 2022-04-27 RX ORDER — ONDANSETRON 2 MG/ML
4 INJECTION INTRAMUSCULAR; INTRAVENOUS ONCE
Status: DISCONTINUED | OUTPATIENT
Start: 2022-04-27 | End: 2022-04-28 | Stop reason: HOSPADM

## 2022-04-27 RX ADMIN — SODIUM CHLORIDE, POTASSIUM CHLORIDE, SODIUM LACTATE AND CALCIUM CHLORIDE 1000 ML: 600; 310; 30; 20 INJECTION, SOLUTION INTRAVENOUS at 23:30

## 2022-04-27 RX ADMIN — SODIUM CHLORIDE, POTASSIUM CHLORIDE, SODIUM LACTATE AND CALCIUM CHLORIDE 1000 ML: 600; 310; 30; 20 INJECTION, SOLUTION INTRAVENOUS at 21:25

## 2022-04-28 VITALS
RESPIRATION RATE: 16 BRPM | OXYGEN SATURATION: 96 % | SYSTOLIC BLOOD PRESSURE: 108 MMHG | DIASTOLIC BLOOD PRESSURE: 62 MMHG | HEIGHT: 65 IN | WEIGHT: 172 LBS | HEART RATE: 86 BPM | TEMPERATURE: 98.2 F | BODY MASS INDEX: 28.66 KG/M2

## 2022-04-28 LAB
AMPHET UR QL SCN: NEGATIVE
BARBITURATES UR QL SCN: NEGATIVE
BENZODIAZ UR QL SCN: POSITIVE
BZE UR QL SCN: NEGATIVE
CANNABINOIDS UR QL SCN: NEGATIVE
METHADONE UR QL SCN: NEGATIVE
OPIATES UR QL SCN: NEGATIVE
OXYCODONE UR QL SCN: NEGATIVE
PCP UR QL SCN: NEGATIVE
POC BREATHALIZER: 0.09 PERCENT (ref 0–0.01)
POC BREATHALIZER: 0.14 PERCENT (ref 0–0.01)
POC BREATHALIZER: 0.15 PERCENT (ref 0–0.01)
PROPOXYPH UR QL SCN: NEGATIVE

## 2022-04-28 PROCEDURE — 302970 POC BREATHALIZER: Performed by: EMERGENCY MEDICINE

## 2022-04-28 PROCEDURE — 90791 PSYCH DIAGNOSTIC EVALUATION: CPT

## 2022-04-28 PROCEDURE — 80307 DRUG TEST PRSMV CHEM ANLYZR: CPT

## 2022-04-28 NOTE — ED PROVIDER NOTES
ED PROVIDER NOTE      12:32 AM  Assumed care from Dr. Corral. Mrs. Trujillo presents with alcohol intoxication (alcohol level 396), depressed, having suicidal thoughts. We are awaiting sobriety and evaluation by alert team. Concerning presentation for this 27 year old woman, 1 month post partum.     Shane Silveira II, M.D. 4/28/2022 4:02 AM

## 2022-04-28 NOTE — DISCHARGE SUMMARY
"  ED Observation Discharge Summary    Patient:Bandar Trujillo  Patient : 1995  Patient MRN: [id number]  Patient PCP: Pcp Pt States None    Admit Date: 2022  Discharge Date and Time: 22 12:32 PM  Discharge Diagnosis: Alcohol abuse, depression  Discharge Attending: Paramjit Moses M.D.  Discharge Service: ED Observation    ED Course  Bandar is a 27 y.o. female who was evaluated at [facility] intoxicated.  She made some suicidal statements.  She was observed in the emergency department.  She sobered.  She admits to drinking heavily.  She says that she does have some depression but is not suicidal.  She has an appointment with psychiatry in 2 days.  She was seen by behavioral health and it is not felt she requires a legal hold.  She has good resources and social support.  At this point she is stable for discharge.  She was precautioned to return to the ER for any suicidal thoughts or concern.    Discharge Exam:  /62   Pulse 86   Temp 36.8 °C (98.2 °F) (Temporal)   Resp 16   Ht 1.651 m (5' 5\")   Wt 78 kg (172 lb)   SpO2 96%   BMI 28.62 kg/m² .    Constitutional: Awake and alert. Nontoxic  HENT:  Grossly normal  Eyes: Grossly normal  Neck: Normal range of motion  Cardiovascular: Normal heart rate   Thorax & Lungs: No respiratory distress  Abdomen: Nontender  Skin:  No pathologic rash.   Extremities: Well perfused  Psychiatric: Affect normal    Labs  Results for orders placed or performed during the hospital encounter of 22   HCG QUAL SERUM   Result Value Ref Range    Beta-Hcg Qualitative Serum Negative Negative   CBC WITH DIFFERENTIAL   Result Value Ref Range    WBC 4.9 4.8 - 10.8 K/uL    RBC 4.56 4.20 - 5.40 M/uL    Hemoglobin 13.1 12.0 - 16.0 g/dL    Hematocrit 40.2 37.0 - 47.0 %    MCV 88.2 81.4 - 97.8 fL    MCH 28.7 27.0 - 33.0 pg    MCHC 32.6 (L) 33.6 - 35.0 g/dL    RDW 42.6 35.9 - 50.0 fL    Platelet Count 250 164 - 446 K/uL    MPV 9.4 9.0 - 12.9 fL    " Neutrophils-Polys 38.40 (L) 44.00 - 72.00 %    Lymphocytes 51.10 (H) 22.00 - 41.00 %    Monocytes 7.50 0.00 - 13.40 %    Eosinophils 1.60 0.00 - 6.90 %    Basophils 1.00 0.00 - 1.80 %    Immature Granulocytes 0.40 0.00 - 0.90 %    Nucleated RBC 0.00 /100 WBC    Neutrophils (Absolute) 1.89 (L) 2.00 - 7.15 K/uL    Lymphs (Absolute) 2.52 1.00 - 4.80 K/uL    Monos (Absolute) 0.37 0.00 - 0.85 K/uL    Eos (Absolute) 0.08 0.00 - 0.51 K/uL    Baso (Absolute) 0.05 0.00 - 0.12 K/uL    Immature Granulocytes (abs) 0.02 0.00 - 0.11 K/uL    NRBC (Absolute) 0.00 K/uL   COMP METABOLIC PANEL   Result Value Ref Range    Sodium 145 135 - 145 mmol/L    Potassium 3.1 (L) 3.6 - 5.5 mmol/L    Chloride 110 96 - 112 mmol/L    Co2 20 20 - 33 mmol/L    Anion Gap 15.0 7.0 - 16.0    Glucose 105 (H) 65 - 99 mg/dL    Bun 9 8 - 22 mg/dL    Creatinine 0.81 0.50 - 1.40 mg/dL    Calcium 8.1 (L) 8.5 - 10.5 mg/dL    AST(SGOT) 20 12 - 45 U/L    ALT(SGPT) 19 2 - 50 U/L    Alkaline Phosphatase 90 30 - 99 U/L    Total Bilirubin 0.2 0.1 - 1.5 mg/dL    Albumin 4.5 3.2 - 4.9 g/dL    Total Protein 6.9 6.0 - 8.2 g/dL    Globulin 2.4 1.9 - 3.5 g/dL    A-G Ratio 1.9 g/dL   DIAGNOSTIC ALCOHOL   Result Value Ref Range    Diagnostic Alcohol 396.0 (H) 0.0 - 10.0 mg/dL   ESTIMATED GFR   Result Value Ref Range    GFR (CKD-EPI) 101 >60 mL/min/1.73 m 2   URINE DRUG SCREEN   Result Value Ref Range    Amphetamines Urine Negative Negative    Barbiturates Negative Negative    Benzodiazepines Positive (A) Negative    Cocaine Metabolite Negative Negative    Methadone Negative Negative    Opiates Negative Negative    Oxycodone Negative Negative    Phencyclidine -Pcp Negative Negative    Propoxyphene Negative Negative    Cannabinoid Metab Negative Negative   POC BREATHALIZER   Result Value Ref Range    POC Breathalizer 0.147 (A) 0.00 - 0.01 Percent   POC BREATHALIZER   Result Value Ref Range    POC Breathalizer 0.144 (A) 0.00 - 0.01 Percent   POC BREATHALIZER   Result Value Ref  Range    POC Breathalizer 0.093 (A) 0.00 - 0.01 Percent       Discharge Condition: Stable    Disposition: Home    Electronically signed by: Paramjit Moses M.D., 4/28/2022 12:32 PM

## 2022-04-28 NOTE — ED NOTES
Pt to be dc'd now, given personal belongings from locker 12, pt has safety plan from alert team RN, grandmother coming to  pt now

## 2022-04-28 NOTE — ED NOTES
PT up to restroom and back to bed in last hour times x1. PT UA collected and sent to lab. PT resting on left side in view of nursing station.

## 2022-04-28 NOTE — ED NOTES
Unable to obtain med rec at this time  Patient unable to be interviewed  Home pharmacy ([facility]) is closed
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
## 2022-04-28 NOTE — ED NOTES
Pt resting in bed on right side. PT awakes to nurses voices then falls back to sleep. PT is in direct view of nursing station.

## 2022-04-28 NOTE — ED NOTES
Pt resting in bed, VSS on RA, GCS 15, NAD, aware POC to see behavioral health RN once sober, in view of nursing station

## 2022-04-28 NOTE — ED NOTES
Pt sleeping in bed, respirations equal/unlabored, NAD, re eval once sober, pt is in view of nursing station

## 2022-04-28 NOTE — ED PROVIDER NOTES
ED Provider Note    Scribed for Valerio Corral M.D. by Jimi Paulino. 4/27/2022, 9:02 PM.    Primary care provider: Pcp Pt States None  Means of arrival: EMS  History obtained from: Patient  History limited by: Alcohol intoxication/sedation    CHIEF COMPLAINT  Chief Complaint   Patient presents with   • Alcohol Intoxication     PT BIB EMS. Family took PT to [facility] for admit for ETOH use. At [facility] before admit PT became combative hitting and kicking staff. PT family reported PT drinking 1/2 pint of vodka on the way to [facility]. EMS put PT in 4 point soft restraints. PT also given 2mg versed IM and 5mg Haldol IM in route to hospital by EMS.         HPI  Bandar Trujillo is a 27 y.o. female who presents to the Emergency Department agitation alcohol intoxication.  Apparently the patient was taken to [facility] by her friends on their way to [facility] to apparently drink half a pint of vodka as well as been drinking all day.  When she arrived at [facility] she became combative with the staff hitting and kicking them.  EMS was called and they had to four-point restrain her.  She was given 2 mg of Versed and 5 of Haldol in route.  On arrival here in the emergency department patient is sedated will groan to painful stimulus.  History is unobtainable by patient because of sedation and alcohol intoxication.    Patient's family member I believe her mother or grandmother came.  She states that the patient is 1 month postpartum and has been struggling with severe postpartum depression.  She states that she admitted suicidal comments today stating that she no longer wanted to live.  That she did not think that her children deserve her because she is a bad mother.  Because of this her sponsors were trying to take her to [facility].  Patient has a history of significant problems with alcoholism.  She does not know of any seizures with alcohol withdrawal.    REVIEW OF  "SYSTEMS    unobtainable secondary to patient condition    PAST MEDICAL HISTORY     1 month postpartum    SURGICAL HISTORY   has a past surgical history that includes primary c section (10/22/2014).    SOCIAL HISTORY  Social History     Tobacco Use   • Smoking status: Former Smoker     Packs/day: 0.50     Years: 3.00     Pack years: 1.50     Types: Cigarettes     Quit date: 2016     Years since quittin.3   • Smokeless tobacco: Never Used   Vaping Use   • Vaping Use: Former   • Quit date: 2020   • Substances: Nicotine   • Devices: Pre-filled pod   Substance Use Topics   • Alcohol use: No     Comment: Occ. when not pregnant.   • Drug use: No      Social History     Substance and Sexual Activity   Drug Use No       FAMILY HISTORY  Family History   Problem Relation Age of Onset   • Other Mother         Epilepsy   • No Known Problems Father    • No Known Problems Sister    • No Known Problems Brother        CURRENT MEDICATIONS  Home Medications    **Home medications have not yet been reviewed for this encounter**         ALLERGIES  Allergies   Allergen Reactions   • Tape Hives and Itching     silk       PHYSICAL EXAM  VITAL SIGNS: BP (!) 87/52   Pulse 69   Temp 35.9 °C (96.6 °F) (Temporal)   Resp 16   Ht 1.651 m (5' 5\")   Wt 78 kg (172 lb)   SpO2 99%   BMI 28.62 kg/m²     Constitutional: Well developed, Well nourished, no acute distress.   HENT: Normocephalic, Atraumatic,.   Eyes: Conjunctiva normal, No discharge.  Pupils are 3 mm  Cardiovascular: Normal heart rate, Normal rhythm, No murmurs, equal pulses.   Pulmonary: Normal breath sounds, No respiratory distress, No wheezing, No rales, No rhonchi.  Abdomen:Soft, No tenderness.   Musculoskeletal: No major deformities noted, No tenderness.   Skin: Warm, Dry, No erythema, No rash.   Neurologic: Patient is sedated, she will groan to sternal rub, protecting airway, moves all 4 extremities   psychiatric: Unobtainable      LABS  Results for orders placed or " performed during the hospital encounter of 04/27/22   HCG QUAL SERUM   Result Value Ref Range    Beta-Hcg Qualitative Serum Negative Negative   CBC WITH DIFFERENTIAL   Result Value Ref Range    WBC 4.9 4.8 - 10.8 K/uL    RBC 4.56 4.20 - 5.40 M/uL    Hemoglobin 13.1 12.0 - 16.0 g/dL    Hematocrit 40.2 37.0 - 47.0 %    MCV 88.2 81.4 - 97.8 fL    MCH 28.7 27.0 - 33.0 pg    MCHC 32.6 (L) 33.6 - 35.0 g/dL    RDW 42.6 35.9 - 50.0 fL    Platelet Count 250 164 - 446 K/uL    MPV 9.4 9.0 - 12.9 fL    Neutrophils-Polys 38.40 (L) 44.00 - 72.00 %    Lymphocytes 51.10 (H) 22.00 - 41.00 %    Monocytes 7.50 0.00 - 13.40 %    Eosinophils 1.60 0.00 - 6.90 %    Basophils 1.00 0.00 - 1.80 %    Immature Granulocytes 0.40 0.00 - 0.90 %    Nucleated RBC 0.00 /100 WBC    Neutrophils (Absolute) 1.89 (L) 2.00 - 7.15 K/uL    Lymphs (Absolute) 2.52 1.00 - 4.80 K/uL    Monos (Absolute) 0.37 0.00 - 0.85 K/uL    Eos (Absolute) 0.08 0.00 - 0.51 K/uL    Baso (Absolute) 0.05 0.00 - 0.12 K/uL    Immature Granulocytes (abs) 0.02 0.00 - 0.11 K/uL    NRBC (Absolute) 0.00 K/uL   COMP METABOLIC PANEL   Result Value Ref Range    Sodium 145 135 - 145 mmol/L    Potassium 3.1 (L) 3.6 - 5.5 mmol/L    Chloride 110 96 - 112 mmol/L    Co2 20 20 - 33 mmol/L    Anion Gap 15.0 7.0 - 16.0    Glucose 105 (H) 65 - 99 mg/dL    Bun 9 8 - 22 mg/dL    Creatinine 0.81 0.50 - 1.40 mg/dL    Calcium 8.1 (L) 8.5 - 10.5 mg/dL    AST(SGOT) 20 12 - 45 U/L    ALT(SGPT) 19 2 - 50 U/L    Alkaline Phosphatase 90 30 - 99 U/L    Total Bilirubin 0.2 0.1 - 1.5 mg/dL    Albumin 4.5 3.2 - 4.9 g/dL    Total Protein 6.9 6.0 - 8.2 g/dL    Globulin 2.4 1.9 - 3.5 g/dL    A-G Ratio 1.9 g/dL   DIAGNOSTIC ALCOHOL   Result Value Ref Range    Diagnostic Alcohol 396.0 (H) 0.0 - 10.0 mg/dL   ESTIMATED GFR   Result Value Ref Range    GFR (CKD-EPI) 101 >60 mL/min/1.73 m 2       All labs reviewed by me.      COURSE & MEDICAL DECISION MAKING  Pertinent Labs & Imaging studies reviewed. (See chart for  details)    9:02 PM - Patient seen and examined at bedside. Patient will be treated with The patient will receive IV fluids for hypotension.  CBC, alcohol, to evaluate her symptoms. The differential diagnoses include but are not limited to: Alcohol intoxication, dehydration, electrolyte abnormality, postpartum depression, suicidal ideation    Patient's blood pressure improved with IV fluids.    Patient admitted to ED observation at 2300 pending sobriety and evaluation by life skills    Medical Decision Making: Patient presents with significant alcohol intoxication and agitation requiring chemical sedation to facilitate transport and care.  At this point time she is protecting her airway    Patient is turned over to my partner at midnight pending sobriety and evaluation by life skills    FINAL IMPRESSION  1. Alcoholic intoxication without complication (HCC)    2. Agitation    3. Postpartum depression    4. Suicidal ideation          Jimi LEVY (Vicente), am scribing for, and in the presence of, Valerio Corral M.D.    Electronically signed by: Jimi Paulino (Vicente), 4/27/2022    Valerio LEVY M.D. personally performed the services described in this documentation, as scribed by Jimi Paulino in my presence, and it is both accurate and complete.    The note accurately reflects work and decisions made by me.  Valerio Corral M.D.  4/27/2022  11:17 PM

## 2022-04-28 NOTE — CONSULTS
Alert team aware of consult   Will wait to complete assessment when patient metabolizes ETOH, currently patient at 0.147 at 0552 this morning

## 2023-11-06 ENCOUNTER — HOSPITAL ENCOUNTER (EMERGENCY)
Facility: MEDICAL CENTER | Age: 28
End: 2023-11-06
Payer: MEDICAID